# Patient Record
Sex: FEMALE | Race: WHITE | NOT HISPANIC OR LATINO | URBAN - METROPOLITAN AREA
[De-identification: names, ages, dates, MRNs, and addresses within clinical notes are randomized per-mention and may not be internally consistent; named-entity substitution may affect disease eponyms.]

---

## 2024-08-03 ENCOUNTER — INPATIENT (INPATIENT)
Facility: HOSPITAL | Age: 79
LOS: 2 days | Discharge: HOME | DRG: 641 | End: 2024-08-06
Attending: FAMILY MEDICINE | Admitting: FAMILY MEDICINE
Payer: MEDICARE

## 2024-08-03 VITALS
TEMPERATURE: 98 F | WEIGHT: 163.58 LBS | OXYGEN SATURATION: 95 % | DIASTOLIC BLOOD PRESSURE: 77 MMHG | SYSTOLIC BLOOD PRESSURE: 174 MMHG | HEART RATE: 85 BPM | RESPIRATION RATE: 18 BRPM

## 2024-08-03 DIAGNOSIS — R11.0 NAUSEA: ICD-10-CM

## 2024-08-03 DIAGNOSIS — E78.00 PURE HYPERCHOLESTEROLEMIA, UNSPECIFIED: ICD-10-CM

## 2024-08-03 DIAGNOSIS — E11.65 TYPE 2 DIABETES MELLITUS WITH HYPERGLYCEMIA: ICD-10-CM

## 2024-08-03 DIAGNOSIS — E86.0 DEHYDRATION: ICD-10-CM

## 2024-08-03 DIAGNOSIS — K80.20 CALCULUS OF GALLBLADDER WITHOUT CHOLECYSTITIS WITHOUT OBSTRUCTION: ICD-10-CM

## 2024-08-03 DIAGNOSIS — N32.9 BLADDER DISORDER, UNSPECIFIED: ICD-10-CM

## 2024-08-03 DIAGNOSIS — I10 ESSENTIAL (PRIMARY) HYPERTENSION: ICD-10-CM

## 2024-08-03 LAB
A1C WITH ESTIMATED AVERAGE GLUCOSE RESULT: 7.9 % — HIGH (ref 4–5.6)
ANION GAP SERPL CALC-SCNC: 6 MMOL/L — SIGNIFICANT CHANGE UP (ref 5–17)
BUN SERPL-MCNC: 9 MG/DL — SIGNIFICANT CHANGE UP (ref 7–23)
CALCIUM SERPL-MCNC: 8.3 MG/DL — LOW (ref 8.5–10.1)
CHLORIDE SERPL-SCNC: 111 MMOL/L — HIGH (ref 96–108)
CO2 SERPL-SCNC: 25 MMOL/L — SIGNIFICANT CHANGE UP (ref 22–31)
CREAT SERPL-MCNC: 1.2 MG/DL — SIGNIFICANT CHANGE UP (ref 0.5–1.3)
EGFR: 46 ML/MIN/1.73M2 — LOW
ESTIMATED AVERAGE GLUCOSE: 180 MG/DL — HIGH (ref 68–114)
GLUCOSE BLDC GLUCOMTR-MCNC: 147 MG/DL — HIGH (ref 70–99)
GLUCOSE BLDC GLUCOMTR-MCNC: 215 MG/DL — HIGH (ref 70–99)
GLUCOSE BLDC GLUCOMTR-MCNC: 254 MG/DL — HIGH (ref 70–99)
GLUCOSE BLDC GLUCOMTR-MCNC: 95 MG/DL — SIGNIFICANT CHANGE UP (ref 70–99)
GLUCOSE SERPL-MCNC: 260 MG/DL — HIGH (ref 70–99)
POTASSIUM SERPL-MCNC: 3.4 MMOL/L — LOW (ref 3.5–5.3)
POTASSIUM SERPL-SCNC: 3.4 MMOL/L — LOW (ref 3.5–5.3)
SODIUM SERPL-SCNC: 142 MMOL/L — SIGNIFICANT CHANGE UP (ref 135–145)

## 2024-08-03 PROCEDURE — 76705 ECHO EXAM OF ABDOMEN: CPT | Mod: 26

## 2024-08-03 RX ORDER — INSULIN LISPRO 100/ML
VIAL (ML) SUBCUTANEOUS AT BEDTIME
Refills: 0 | Status: DISCONTINUED | OUTPATIENT
Start: 2024-08-03 | End: 2024-08-06

## 2024-08-03 RX ORDER — DEXTROSE 4 G
25 TABLET,CHEWABLE ORAL ONCE
Refills: 0 | Status: DISCONTINUED | OUTPATIENT
Start: 2024-08-03 | End: 2024-08-06

## 2024-08-03 RX ORDER — MELATONIN 3 MG
3 TABLET ORAL AT BEDTIME
Refills: 0 | Status: DISCONTINUED | OUTPATIENT
Start: 2024-08-03 | End: 2024-08-03

## 2024-08-03 RX ORDER — SODIUM CHLORIDE AND POTASSIUM CHLORIDE 150; 450 MG/100ML; MG/100ML
1000 INJECTION, SOLUTION INTRAVENOUS
Refills: 0 | Status: DISCONTINUED | OUTPATIENT
Start: 2024-08-03 | End: 2024-08-03

## 2024-08-03 RX ORDER — DEXTROSE MONOHYDRATE, SODIUM CHLORIDE, SODIUM LACTATE, CALCIUM CHLORIDE, MAGNESIUM CHLORIDE 1.5; 538; 448; 18.4; 5.08 G/100ML; MG/100ML; MG/100ML; MG/100ML; MG/100ML
1000 SOLUTION INTRAPERITONEAL
Refills: 0 | Status: DISCONTINUED | OUTPATIENT
Start: 2024-08-03 | End: 2024-08-06

## 2024-08-03 RX ORDER — INSULIN LISPRO 100/ML
VIAL (ML) SUBCUTANEOUS EVERY 6 HOURS
Refills: 0 | Status: DISCONTINUED | OUTPATIENT
Start: 2024-08-03 | End: 2024-08-03

## 2024-08-03 RX ORDER — LIDOCAINE 5% 5 G/100G
5 CREAM TOPICAL DAILY
Refills: 0 | Status: DISCONTINUED | OUTPATIENT
Start: 2024-08-03 | End: 2024-08-06

## 2024-08-03 RX ORDER — INSULIN LISPRO 100/ML
VIAL (ML) SUBCUTANEOUS
Refills: 0 | Status: DISCONTINUED | OUTPATIENT
Start: 2024-08-03 | End: 2024-08-06

## 2024-08-03 RX ORDER — INSULIN LISPRO 100/ML
VIAL (ML) SUBCUTANEOUS
Refills: 0 | Status: DISCONTINUED | OUTPATIENT
Start: 2024-08-03 | End: 2024-08-03

## 2024-08-03 RX ORDER — INSULIN LISPRO 100/ML
VIAL (ML) SUBCUTANEOUS AT BEDTIME
Refills: 0 | Status: DISCONTINUED | OUTPATIENT
Start: 2024-08-03 | End: 2024-08-03

## 2024-08-03 RX ORDER — GUAIFEN/P-PROPANOLAMIN/CODEINE
5 EXPECTORANT ORAL AT BEDTIME
Refills: 0 | Status: DISCONTINUED | OUTPATIENT
Start: 2024-08-03 | End: 2024-08-06

## 2024-08-03 RX ORDER — DEXTROSE 4 G
12.5 TABLET,CHEWABLE ORAL ONCE
Refills: 0 | Status: DISCONTINUED | OUTPATIENT
Start: 2024-08-03 | End: 2024-08-06

## 2024-08-03 RX ORDER — MAGNESIUM, ALUMINUM HYDROXIDE 200-225/5
30 SUSPENSION, ORAL (FINAL DOSE FORM) ORAL EVERY 4 HOURS
Refills: 0 | Status: DISCONTINUED | OUTPATIENT
Start: 2024-08-03 | End: 2024-08-06

## 2024-08-03 RX ORDER — GLIMEPIRIDE 4 MG/1
1 TABLET ORAL
Refills: 0 | DISCHARGE

## 2024-08-03 RX ORDER — GLUCAGON INJECTION, SOLUTION 0.5 MG/.1ML
1 INJECTION, SOLUTION SUBCUTANEOUS ONCE
Refills: 0 | Status: DISCONTINUED | OUTPATIENT
Start: 2024-08-03 | End: 2024-08-06

## 2024-08-03 RX ORDER — METOCLOPRAMIDE HCL 5 MG/ML
5 VIAL (ML) INJECTION EVERY 8 HOURS
Refills: 0 | Status: DISCONTINUED | OUTPATIENT
Start: 2024-08-03 | End: 2024-08-03

## 2024-08-03 RX ORDER — ACETAMINOPHEN 500 MG
650 TABLET ORAL EVERY 6 HOURS
Refills: 0 | Status: DISCONTINUED | OUTPATIENT
Start: 2024-08-03 | End: 2024-08-06

## 2024-08-03 RX ORDER — SODIUM CHLORIDE AND POTASSIUM CHLORIDE 150; 450 MG/100ML; MG/100ML
1000 INJECTION, SOLUTION INTRAVENOUS
Refills: 0 | Status: DISCONTINUED | OUTPATIENT
Start: 2024-08-03 | End: 2024-08-06

## 2024-08-03 RX ORDER — HEPARIN SODIUM 1000 [USP'U]/ML
5000 INJECTION, SOLUTION INTRAVENOUS; SUBCUTANEOUS EVERY 12 HOURS
Refills: 0 | Status: DISCONTINUED | OUTPATIENT
Start: 2024-08-03 | End: 2024-08-05

## 2024-08-03 RX ORDER — ONDANSETRON HCL/PF 4 MG/2 ML
4 VIAL (ML) INJECTION EVERY 8 HOURS
Refills: 0 | Status: DISCONTINUED | OUTPATIENT
Start: 2024-08-03 | End: 2024-08-06

## 2024-08-03 RX ORDER — LOSARTAN POTASSIUM 50 MG/1
100 TABLET, FILM COATED ORAL DAILY
Refills: 0 | Status: DISCONTINUED | OUTPATIENT
Start: 2024-08-03 | End: 2024-08-05

## 2024-08-03 RX ORDER — AMLODIPINE BESYLATE 2.5 MG/1
2.5 TABLET ORAL EVERY 24 HOURS
Refills: 0 | Status: DISCONTINUED | OUTPATIENT
Start: 2024-08-03 | End: 2024-08-05

## 2024-08-03 RX ORDER — MAGNESIUM OXIDE 253 MG/1
400 TABLET ORAL DAILY
Refills: 0 | Status: DISCONTINUED | OUTPATIENT
Start: 2024-08-03 | End: 2024-08-06

## 2024-08-03 RX ORDER — MECLIZINE 12.5 MG/1
12.5 TABLET ORAL THREE TIMES A DAY
Refills: 0 | Status: DISCONTINUED | OUTPATIENT
Start: 2024-08-03 | End: 2024-08-06

## 2024-08-03 RX ORDER — DEXTROSE 4 G
15 TABLET,CHEWABLE ORAL ONCE
Refills: 0 | Status: DISCONTINUED | OUTPATIENT
Start: 2024-08-03 | End: 2024-08-06

## 2024-08-03 RX ADMIN — MAGNESIUM OXIDE 400 MILLIGRAM(S): 253 TABLET ORAL at 11:24

## 2024-08-03 RX ADMIN — SODIUM CHLORIDE AND POTASSIUM CHLORIDE 75 MILLILITER(S): 150; 450 INJECTION, SOLUTION INTRAVENOUS at 22:13

## 2024-08-03 RX ADMIN — LOSARTAN POTASSIUM 100 MILLIGRAM(S): 50 TABLET, FILM COATED ORAL at 06:31

## 2024-08-03 RX ADMIN — Medication 40 MILLIGRAM(S): at 21:37

## 2024-08-03 RX ADMIN — Medication 4: at 17:38

## 2024-08-03 RX ADMIN — Medication 650 MILLIGRAM(S): at 21:00

## 2024-08-03 RX ADMIN — HEPARIN SODIUM 5000 UNIT(S): 1000 INJECTION, SOLUTION INTRAVENOUS; SUBCUTANEOUS at 17:37

## 2024-08-03 RX ADMIN — Medication 650 MILLIGRAM(S): at 20:01

## 2024-08-03 RX ADMIN — Medication 5 MILLIGRAM(S): at 22:51

## 2024-08-03 RX ADMIN — SODIUM CHLORIDE AND POTASSIUM CHLORIDE 75 MILLILITER(S): 150; 450 INJECTION, SOLUTION INTRAVENOUS at 07:38

## 2024-08-03 RX ADMIN — AMLODIPINE BESYLATE 2.5 MILLIGRAM(S): 2.5 TABLET ORAL at 11:24

## 2024-08-03 NOTE — H&P ADULT - MUSCULOSKELETAL
ROM intact/no joint swelling/no joint erythema/no joint warmth/decreased strength/abnormal gait details…

## 2024-08-03 NOTE — H&P ADULT - HISTORY OF PRESENT ILLNESS
ADÁN MERRILLBISHOPSILVIOMARCELASOTO is a  77 YO Female with PMH of HTN, DM who presents with some lightheadedness, nausea and concern for dehydration. patient states that for the past couple days, she has been having the symptoms, lightheaded like she might pass out, some nausea, decreased appetite and overall fatigue. of note, patient has been dealing with tooth infection and root canal, has been on abx for past month, finished last course a few days ago.   ADÁN MERRILLBISHOPSILVIOMARCELASOTO is a  79 YO Female with PMH of HTN, DM who presents with lightheadedness, nausea and concern for dehydration. Patient states that for the past couple days, she has been having the symptoms, lightheaded like she might pass out, some nausea, decreased appetite and fatigue. Patient has been dealing with tooth infection and root canal, has been on abiotic for past month, finished last course a few days ago.

## 2024-08-03 NOTE — H&P ADULT - NEUROLOGICAL
details… cranial nerves II-XII intact/sensation intact/deep reflexes intact/cranial nerves intact/no spontaneous movement/superficial reflexes intact/upper extremity  reflex/lower extremity reflex

## 2024-08-03 NOTE — CHART NOTE - NSCHARTNOTEFT_GEN_A_CORE
RN called, patient was transferred from , had orders that are on hold. RN requesting to release orders that have "hold session".  - Will reactivate orders  - Primary team to follow in daytime shift  - RN to call if any changes RN called, patient was transferred from , had orders that are on hold. RN requesting to release orders that have "hold session".  - Will reactivate orders  - Primary team to follow in daytime shift  - RN to call if any changes    _____________________________________________________________________  ADDENDUM  RN called as ultrasound requesting patient be NPO for US abdomen.   - No documents listed, chart may still be in process of merging with  chart  - NPO except medications  - RN to call if any changes RN called, patient was transferred from , had orders that are on hold. RN requesting to release orders that have "hold session".  - Will reactivate orders  - Primary team to follow in daytime shift  - RN to call if any changes    _____________________________________________________________________  ADDENDUM  RN called as ultrasound requesting patient be NPO for US abdomen.     On chart review, ED SY:  78F with PMH of HTN, DM who presents with some lightheadedness, nausea and concern for dehydration. patient states that for the past couple days, she has been having the symptoms, lightheaded like she might pass out, some nausea, decreased appetite and overall fatigue. of note, patient has been dealing with tooth infection and root canal, has been on abx for past month, finished last course a few days ago.    - No documents listed, [facility] chart may still be in process of merging with SY chart  - No H&P listed. Primary team to open and complete  - NPO except medications  - RN to call if any changes RN called, patient was transferred from , had orders that are on hold. RN requesting to release orders that have "hold session".  - Will reactivate orders  - Primary team to follow in daytime shift  - RN to call if any changes    _____________________________________________________________________  ADDENDUM  RN called as ultrasound requesting patient be NPO for US abdomen.     On chart review, ED SY:  78F with PMH of HTN, DM who presents with some lightheadedness, nausea and concern for dehydration. patient states that for the past couple days, she has been having the symptoms, lightheaded like she might pass out, some nausea, decreased appetite and overall fatigue. of note, patient has been dealing with tooth infection and root canal, has been on abx for past month, finished last course a few days ago.    - No documents listed, [facility] chart may still be in process of merging with SY chart  - No H&P listed. Primary team to open and complete  - NPO except medications  - RN to call if any changes    _____________________________________________________________________  ADDENDUM  Pharmacy called as Primary Dr. Mercado ordered both melatonin and ambien PRN. On chart review, patient takes ambien at home. Pharmacy also requesting to discontinue reglan for breakthrough nausea as patient had PRN zofran ordered and x1 doses of reglan would be preferred instead of PRN  - Will discontinue PRN melatonin and reglan  - Primary team to follow in daytime shift, to reassess need for reglan

## 2024-08-03 NOTE — CONSULT NOTE ADULT - ASSESSMENT
Pancreatic head calcification versus choledocholithiasis  Nausea    zofran PRN  diet as tolerated  monitor for abd. pain  will need MRCP  d/w pt and family  will follow

## 2024-08-03 NOTE — PHYSICAL THERAPY INITIAL EVALUATION ADULT - PERTINENT HX OF CURRENT PROBLEM, REHAB EVAL
Per H&P: " is a  79 YO Female with PMH of HTN, DM who presents with some lightheadedness, nausea and concern for dehydration. patient states that for the past couple days, she has been having the symptoms, lightheaded like she might pass out, some nausea, decreased appetite and overall fatigue. of note, patient has been dealing with tooth infection and root canal, has been on abx for past month, finished last course a few days ago."

## 2024-08-03 NOTE — PHYSICAL THERAPY INITIAL EVALUATION ADULT - MD/RN NOTIFIED
PAST MEDICAL HISTORY:  DM (diabetes mellitus)     ESRD on dialysis     H/O:      H/O: hysterectomy     HLD (hyperlipidemia)     HTN (hypertension)     S/P CABG x 3     
yes

## 2024-08-03 NOTE — CONSULT NOTE ADULT - SUBJECTIVE AND OBJECTIVE BOX
[facility]    Alvin Sharad Villa NP    [address] [place] [address] [phone]      Chief Complaint:  Patient is a 78y old  Female who presents with a chief complaint of nausea, dehydration lightheadedness     HPI:HPI:  DAÁN GUSMAN is a  79 YO Female with PMH of HTN, DM who presents with some lightheadedness, nausea and concern for dehydration. patient states that for the past couple days, she has been having the symptoms, lightheaded like she might pass out, some nausea, decreased appetite and overall fatigue. of note, patient has been dealing with tooth infection and root canal, has been on abx for past month, finished last course a few days ago.   (03 Aug 2024 05:43)  GI consulted for CT findings of  Pancreatic head calcification versus choledocholithiasis. pt seen and examined daughter at bedside. Pt denies having abdominal pain, continues to have intermittent nausea no vomiting. No prior hx of gi problems was following up with Dr. Alvarez had EGD/Colonoscopy many years ago. No history of constipation.     Allergies:  iodine (Unknown)      Medications:  acetaminophen     Tablet .. 650 milliGRAM(s) Oral every 6 hours PRN  aluminum hydroxide/magnesium hydroxide/simethicone Suspension 30 milliLiter(s) Oral every 4 hours PRN  amLODIPine   Tablet 2.5 milliGRAM(s) Oral every 24 hours  dextrose 5%. 1000 milliLiter(s) IV Continuous <Continuous>  dextrose 5%. 1000 milliLiter(s) IV Continuous <Continuous>  dextrose 50% Injectable 25 Gram(s) IV Push once  dextrose 50% Injectable 25 Gram(s) IV Push once  dextrose 50% Injectable 12.5 Gram(s) IV Push once  dextrose Oral Gel 15 Gram(s) Oral once  glucagon  Injectable 1 milliGRAM(s) IntraMuscular once  heparin   Injectable 5000 Unit(s) SubCutaneous every 12 hours  insulin lispro (ADMELOG) corrective regimen sliding scale   SubCutaneous three times a day before meals  insulin lispro (ADMELOG) corrective regimen sliding scale   SubCutaneous at bedtime  lidocaine 2% Viscous 5 milliLiter(s) Swish and Spit daily PRN  losartan 100 milliGRAM(s) Oral daily  magnesium oxide 400 milliGRAM(s) Oral daily  meclizine 12.5 milliGRAM(s) Oral three times a day PRN  ondansetron Injectable 4 milliGRAM(s) IV Push every 8 hours PRN  simvastatin 40 milliGRAM(s) Oral at bedtime  sodium chloride 0.45% with potassium chloride 20 mEq/L 1000 milliLiter(s) IV Continuous <Continuous>  zolpidem 5 milliGRAM(s) Oral at bedtime PRN  zolpidem 5 milliGRAM(s) Oral at bedtime PRN      PMHX/PSHX:      Family history:      Social History:     ROS:     General:  No wt loss, fevers, chills, night sweats, fatigue,   Eyes:  Good vision, no reported pain  ENT:  No sore throat, pain, runny nose, dysphagia  CV:  No pain, palpitations, hypo/hypertension  Resp:  No dyspnea, cough, tachypnea, wheezing  GI:  No pain, +nausea, No vomiting, No diarrhea, No constipation, No weight loss, No fever, No pruritis, No rectal bleeding, No tarry stools, No dysphagia,  :  No pain, bleeding, incontinence, nocturia  Muscle:  No pain, weakness  Neuro:  No weakness, tingling, memory problems  Psych:  No fatigue, insomnia, mood problems, depression  Endocrine:  No polyuria, polydipsia, cold/heat intolerance  Heme:  No petechiae, ecchymosis, easy bruisability  Skin:  No rash, tattoos, scars, edema      PHYSICAL EXAM:   Vital Signs Last 24 Hrs  T(C): 36.5 (03 Aug 2024 11:15), Max: 36.8 (02 Aug 2024 15:26)  T(F): 97.7 (03 Aug 2024 11:15), Max: 98.2 (02 Aug 2024 15:26)  HR: 72 (03 Aug 2024 11:15) (67 - 85)  BP: 149/68 (03 Aug 2024 11:15) (139/65 - 174/77)  BP(mean): --  RR: 18 (03 Aug 2024 11:15) (15 - 18)  SpO2: 98% (03 Aug 2024 11:15) (95% - 98%)    Parameters below as of 03 Aug 2024 11:15  Patient On (Oxygen Delivery Method): room air      Daily     Daily Weight in k.2 (03 Aug 2024 05:29)    GENERAL:  Appears stated age, well-groomed, well-nourished, no distress  HEENT:  NC/AT,  conjunctivae clear and pink, no thyromegaly, nodules, adenopathy, no JVD, sclera -anicteric  CHEST:  Full & symmetric excursion, no increased effort, breath sounds clear  HEART:  Regular rhythm, S1, S2, no murmur/rub/S3/S4, no abdominal bruit, no edema  ABDOMEN:  Soft, non-tender, non-distended, normoactive bowel sounds,  no masses ,no hepato-splenomegaly, no signs of chronic liver disease  EXTEREMITIES:  no cyanosis,clubbing or edema  SKIN:  No rash/erythema/ecchymoses/petechiae/wounds/abscess/warm/dry  NEURO:  Alert, oriented, no asterixis, no tremor, no encephalopathy    LABS:                        11.9   5.26  )-----------( 230      ( 02 Aug 2024 16:09 )             35.8     08-02    137  |  102  |  12  ----------------------------<  250<H>  3.3<L>   |  24  |  1.30    Ca    9.0      02 Aug 2024 16:09  Mg     1.5     08-    TPro  6.8  /  Alb  3.5  /  TBili  0.4  /  DBili  x   /  AST  32  /  ALT  43  /  AlkPhos  57  08-02    LIVER FUNCTIONS - ( 02 Aug 2024 16:09 )  Alb: 3.5 g/dL / Pro: 6.8 g/dL / ALK PHOS: 57 U/L / ALT: 43 U/L / AST: 32 U/L / GGT: x             Urinalysis Basic - ( 02 Aug 2024 17:22 )    Color: Yellow / Appearance: Clear / S.003 / pH: x  Gluc: x / Ketone: Negative mg/dL  / Bili: Negative / Urobili: 0.2 mg/dL   Blood: x / Protein: Negative mg/dL / Nitrite: Negative   Leuk Esterase: Moderate / RBC: 0 /HPF / WBC 6 /HPF   Sq Epi: x / Non Sq Epi: x / Bacteria: Occasional /HPF          Imaging:      < from: CT Abdomen and Pelvis No Cont (24 @ 19:53) >    ACC: [id number] EXAM:  CT ABDOMEN AND PELVIS   ORDERED BY:  JALEESA GONZALEZ     PROCEDURE DATE:  2024          INTERPRETATION:  CLINICAL INFORMATION: Persistent nausea.    COMPARISON: None.    CONTRAST/COMPLICATIONS:  IV Contrast: NONE  Oral Contrast:NONE  Complications: None reported at time of study completion    PROCEDURE:  CT of the Abdomen and Pelvis was performed.  Sagittal and coronal reformats were performed.    FINDINGS:  LOWER CHEST: Large hiatal hernia. Coronary artery calcifications.    LIVER: 1.8 cm cyst hepatic segment 7.  BILE DUCTS: Normal caliber.  GALLBLADDER: Within normal limits.  SPLEEN: Within normal limits.  PANCREAS: Partial fatty replacement. 3 mm calcification in the head of   the pancreas or within the distal CBD.  ADRENALS: Within normal limits.  KIDNEYS/URETERS: Bilateral renal cortical thinning. Left lower pole   parapelvic renal cysts.    BLADDER: 1.8 cm polypoid soft tissue polypoid mass in the right trigone   consistent with urothelial neoplasm.  REPRODUCTIVE ORGANS: Hysterectomy.    BOWEL: No bowel obstruction. Appendix is normal. Colonic diverticulosis.   Large hiatal hernia.  PERITONEUM/RETROPERITONEUM: Within normal limits.  VESSELS: Severe atherosclerotic calcifications.  LYMPH NODES: No lymphadenopathy.  ABDOMINAL WALL: Small fat-containing umbilical hernia.  BONES: Degenerative changes. Osteopenia.    IMPRESSION:  Large hiatal hernia.    1.8 cm polypoid soft tissue polypoid mass in the right trigone consistent   with urothelial urinary bladder neoplasm. Recommend urology consultation.    Pancreatic head calcification versus choledocholithiasis. Recommend MRCP.    Additional findings noted above.    --- End of Report ---            JT GREWAL MD; Attending Radiologist  This document has been electronically signed. Aug  2 2024  8:27PM    < end of copied text >          
  Patient is a 78y old  Female who presents with a chief complaint of     Reason For Consult: dm2 uncontrolled    HPI:  ADÁN GUSMAN is a  79 YO Female with PMH of HTN, DM who presents with some lightheadedness, nausea and concern for dehydration. patient states that for the past couple days, she has been having the symptoms, lightheaded like she might pass out, some nausea, decreased appetite and overall fatigue. of note, patient has been dealing with tooth infection and root canal, has been on abx for past month, finished last course a few days ago.   (03 Aug 2024 05:43)      PAST MEDICAL & SURGICAL HISTORY:      FAMILY HISTORY:        Social History:    MEDICATIONS  (STANDING):  amLODIPine   Tablet 2.5 milliGRAM(s) Oral every 24 hours  dextrose 5%. 1000 milliLiter(s) (50 mL/Hr) IV Continuous <Continuous>  dextrose 5%. 1000 milliLiter(s) (100 mL/Hr) IV Continuous <Continuous>  dextrose 50% Injectable 25 Gram(s) IV Push once  dextrose 50% Injectable 12.5 Gram(s) IV Push once  dextrose 50% Injectable 25 Gram(s) IV Push once  dextrose Oral Gel 15 Gram(s) Oral once  glucagon  Injectable 1 milliGRAM(s) IntraMuscular once  heparin   Injectable 5000 Unit(s) SubCutaneous every 12 hours  insulin lispro (ADMELOG) corrective regimen sliding scale   SubCutaneous every 6 hours  losartan 100 milliGRAM(s) Oral daily  magnesium oxide 400 milliGRAM(s) Oral daily  simvastatin 40 milliGRAM(s) Oral at bedtime  sodium chloride 0.9% with potassium chloride 20 mEq/L 1000 milliLiter(s) (50 mL/Hr) IV Continuous <Continuous>    MEDICATIONS  (PRN):  acetaminophen     Tablet .. 650 milliGRAM(s) Oral every 6 hours PRN Temp greater or equal to 38C (100.4F), Mild Pain (1 - 3)  aluminum hydroxide/magnesium hydroxide/simethicone Suspension 30 milliLiter(s) Oral every 4 hours PRN Dyspepsia  meclizine 12.5 milliGRAM(s) Oral three times a day PRN Dizziness  ondansetron Injectable 4 milliGRAM(s) IV Push every 8 hours PRN Nausea and/or Vomiting  zolpidem 5 milliGRAM(s) Oral at bedtime PRN Insomnia  zolpidem 5 milliGRAM(s) Oral at bedtime PRN Insomnia        T(C): 36.5 (08-03-24 @ 11:15), Max: 36.8 (08-02-24 @ 15:26)  HR: 72 (08-03-24 @ 11:15) (67 - 85)  BP: 149/68 (08-03-24 @ 11:15) (139/65 - 174/77)  RR: 18 (08-03-24 @ 11:15) (15 - 18)  SpO2: 98% (08-03-24 @ 11:15) (95% - 98%)  Wt(kg): --    PHYSICAL EXAM:  CHEST/LUNG: Clear to percussion bilaterally; No rales, rhonchi, wheezing, or rubs  HEART: Regular rate and rhythm; No murmurs, rubs, or gallops  ABDOMEN: Soft, Nontender, Nondistended; Bowel sounds present  EXTREMITIES:  2+ Peripheral Pulses, No clubbing, cyanosis, or edema  SKIN: No rashes or lesions    CAPILLARY BLOOD GLUCOSE      POCT Blood Glucose.: 254 mg/dL (03 Aug 2024 11:48)  POCT Blood Glucose.: 147 mg/dL (03 Aug 2024 05:22)  POCT Blood Glucose.: 146 mg/dL (02 Aug 2024 20:48)  POCT Blood Glucose.: 268 mg/dL (02 Aug 2024 15:39)                            11.9   5.26  )-----------( 230      ( 02 Aug 2024 16:09 )             35.8       CMP:  08-02 @ 16:09  SGPT 43  Albumin 3.5   Alk Phos 57   Anion Gap 11   SGOT 32   Total Bili 0.4   BUN 12   Calcium Total 9.0   CO2 24   Chloride 102   Creatinine 1.30   eGFR if AA --   eGFR if non AA --   Glucose 250   Potassium 3.3   Protein 6.8   Sodium 137      Thyroid Function Tests:      Diabetes Tests:       Radiology:

## 2024-08-03 NOTE — H&P ADULT - NSHPLABSRESULTS_GEN_ALL_CORE
11.9   5.26  )-----------( 230      ( 02 Aug 2024 16:09 )             35.8     02 Aug 2024 16:09    137    |  102    |  12     ----------------------------<  250    3.3     |  24     |  1.30     Ca    9.0        02 Aug 2024 16:09  Mg     1.5       02 Aug 2024 16:09    TPro  6.8    /  Alb  3.5    /  TBili  0.4    /  DBili  x      /  AST  32     /  ALT  43     /  AlkPhos  57     02 Aug 2024 16:09    LIVER FUNCTIONS - ( 02 Aug 2024 16:09 )  Alb: 3.5 g/dL / Pro: 6.8 g/dL / ALK PHOS: 57 U/L / ALT: 43 U/L / AST: 32 U/L / GGT: x           CAPILLARY BLOOD GLUCOSE    POCT Blood Glucose.: 147 mg/dL (03 Aug 2024 05:22)  POCT Blood Glucose.: 146 mg/dL (02 Aug 2024 20:48)  POCT Blood Glucose.: 268 mg/dL (02 Aug 2024 15:39)    Urinalysis Basic - ( 02 Aug 2024 17:22 )    Color: Yellow / Appearance: Clear / S.003 / pH: x  Gluc: x / Ketone: Negative mg/dL  / Bili: Negative / Urobili: 0.2 mg/dL   Blood: x / Protein: Negative mg/dL / Nitrite: Negative   Leuk Esterase: Moderate / RBC: 0 /HPF / WBC 6 /HPF   Sq Epi: x / Non Sq Epi: x / Bacteria: Occasional /HPF    < from: CT Head No Cont (24 @ 19:53) >    IMPRESSION:  No acute intracranial hemorrhage, mass effect, or midline shift.    < end of copied text >    < from: CT Abdomen and Pelvis No Cont (24 @ 19:53) >    IMPRESSION:  Large hiatal hernia.    1.8 cm polypoid soft tissue polypoid mass in the right trigone consistent   with urothelial urinary bladder neoplasm. Recommend urology consultation.    Pancreatic head calcification versus choledocholithiasis. Recommend MRCP.    Additional findings noted above.    < end of copied text >

## 2024-08-03 NOTE — H&P ADULT - RESPIRATORY
normal/clear to auscultation bilaterally/no wheezes/no rales/no rhonchi/intercostal retractions/diminished breath sounds, L

## 2024-08-03 NOTE — PHYSICAL THERAPY INITIAL EVALUATION ADULT - PATIENT PROFILE REVIEW, REHAB EVAL
PT orders received: ambulate as tolerated. Consult with RN Hannah, pt may participate in PT evaluation./yes

## 2024-08-03 NOTE — PATIENT PROFILE ADULT - INTERNATIONAL TRAVEL
History and Physical      Patient Name: Capo Flores   Patient ID: [id number]   Sex: Male   YOB: 2021        Visit Date: 2020    Provider: Ryley Cruz MD   Location: [facility]   Location Address: [address] [place] [address], [address]  [place], [place]  [id number]   Location Phone: [phone]          Chief Complaint  ·  hospital follow-up      History Of Present Illness  The patient is a 4 day old /White male who presents for hospital follow up after  discharge. The child is accompanied by his mother.   Parent Concerns  Mom has no concerns   Maternal Information  The pregnancy was complicated by. late PN   Maternal labs include:   HIV Negative   Hepatitis B Negative   Blood Type/Rh A negative   VDRL Negative   Maternal GBS unknown   Delivery  The child was born via normal spontaneous vaginal delivery at 35.5 weeks EGA. The patient's  course was complicated by tachypnea,nasal flaring,retractions at 10 minutes of life.   The birth weight was 5 pounds, 8 ounces and the hospital discharge weight was 5 pounds, 8 ounces.   ______________________________________________________________________________________  Sleep  The baby is sleeping continuously for up to 3 hours at a time.   Nutrition  The patient is being breast-fed and bottle-fed. He feeds at the breast for 10-15 minutes every 2-3 hours The child is taking in approximately 2 ounces of Neosure and per 24 hours. Source of water is city water.   Elimination  The infant is having 7 wet diapers per day 4 stools per day.   [place] Screening  The infant did pass his hearing screen.   He did pass CHD screeen in nursery.   His  screen is pending.   Growth Chart Reviewed. (F3)   Immunizations (ALT-V): Hepatitis B- up to date.             Allergy List    Allergies Reconciled  Review of Systems  · Constitutional  o Denies  o : fever, fatigue, fussiness, agitation, weight  "changes  · Eyes  o Denies  o : redness, discharge  · HENT  o Denies  o : rhinorrhea, congestion, ear drainage, pulling at ears, mouth sores  · Cardiovascular  o Denies  o : cyanosis, difficulty with feeds  · Respiratory  o Denies  o : cough, wheezing, retractions, increased work of breathing  · Gastrointestinal  o Denies  o : vomiting, diarrhea, constipation, decreased PO intake  · Genitourinary  o Denies  o : hematuria, decreased urine output, discharge  · Integument  o Denies  o : rash, bruising, lesions  · Neurologic  o Denies  o : altered mental status, seizure activity, syncope  · Musculoskeletal  o Denies  o : limp, weakness  · Allergic-Immunologic  o Denies  o : frequent illnesses, allergies      Vitals  Date Time BP Position Site L\R Cuff Size HR RR TEMP (F) WT  HT  BMI kg/m2 BSA m2 O2 Sat HC       2020 10:09 AM         5lbs 8.18oz 1'  7\" 10.73 0.18     2020 10:09 AM         5lbs 8.18oz        2020 10:20 AM      190 - R 16 98.2 5lbs 3.5oz 1'  6.7\" 10.49 0.18 99 % 12.8\"         Physical Examination  · Constitutional  o Tone/Appearance  o : vigorous, good cry, good tone, normal color, no acute distress  · Head and Face  o Head/Neck  o : normocephalic, AF and PF soft., open and flat, sutures well approximated, neck supple, no masses appreciated  · Eyes  o Eyes  o : opens eyes, +RR bilaterally, No discharge  · Ears, Nose, Mouth and Throat  o ENT  o : ears normally positioned and well-formed without pits or tags., nares patent, hard and soft palate intact  · Respiratory  o Inspection of Chest  o :   § Thorax  § : clavicles stable with no crepitus  o Lungs  o : normal chest rise, CTAB  · Cardiovascular  o Heart  o : normal pericordial impulse, RRR, Normal S1 and S2, no murmurs, brachial pulses 2+ and equal bilaterally  o Femoral pulses  o : 2+ and equal bilaterally, no brachiofemoral delay  · Gastrointestinal  o Abdomen  o : soft, non-tender, non-distended, +BS, no hepatosplenomegaly, no masses " palpated  o Umbilical  o : non-erythematous, cord is clean, dry, and intact  · Genitourinary  o Genitals  o :   § Genitals  § : normal male, testes decended , palpable bilaterally  § Anus  § : patent  · Musculoskeletal  o Trunk/Spine  o : no scoliosis or deformities noted, no sacral pits or sd  o Extremeties/Joint  o : Elias negative, Ortelolani negative, no hip clicks or clunks  · Skin and Subcutaneous Tissue  o Skin  o : pink, no jaundice  · Neurologic  o Neurologic/Reflexes  o : actively moves all extremeties, positive suck, rooting, Tiarra, nelson, plantar grasp, [place]          Results  · In-Office Procedures  o Lab procedure  § IOP - BiliChek (70743)   § Bilichek: 9.4 mg/dL  § Internal Control Verified?: Yes       Assessment  · Health supervision for  under 8 days old     V20.31/Z00.110    Problems Reconciled  Plan  · Medications  o Medications have been Reconciled  o Transition of Care or Provider Policy  · Instructions  o Instructed to follow up in 2 weeks.  o Discussed  skin care with caregiver.  o Discussed umbilical cord care with care provider.  o Encouraged to continue breastfeeding, feed infant every 2-3 hours during the day and every 3-4 hours at night.  o Safety and anticipatory guidance discussed and handout given which includes the information below:  o Make sure your baby is put to sleep on his/her back without heavy blankets, stuffed animals, or pillows until he/she can roll over on his/her own.  o Your baby should have at least 6-8 wet diapers each day. Please call our office if your baby has significantly less than that.  o Make sure your babby uses a rear-facing car seat in the back seat of your car until your baby is over 2 years of age.  o At this age, it is recommended that temperatures be taken rectally. Do not add or subtract a degree. A fever is considered anything greater than 100.4 degrees. If your baby is less than 30 days old, please call our office as soon as  possible if your baby has a fever greater than 100.4 rectally. You may give one dose ofTylenol prior to calling.   o Please keep important phone numbers close by: poison control [phone], our office [phone] , etc.   o If you have any concerns, questions, or problems, please call our office at [phone].   o Electronically Identified Patient Education Materials Provided Electronically  · Disposition  o f/u 2 weeks            Electronically Signed by: Ryley Cruz MD -Author on August 25, 2020 10:46:50 AM   No

## 2024-08-03 NOTE — PHYSICAL THERAPY INITIAL EVALUATION ADULT - ADDITIONAL COMMENTS
Pt lives with her spouse in a private house, 6 RAS +HR and 1 flight within the home. Patient reports being independent in ADLs and ambulation prior to admission. Pt owns RW from prior injury.

## 2024-08-03 NOTE — H&P ADULT - ASSESSMENT
ADÁN PICKETT NASEEM is a  79 YO Female with PMH of HTN, DM who presents with lightheadedness, nausea and concern for dehydration. Patient states that for the past couple days, she has been having the symptoms, lightheaded like she might pass out, some nausea, decreased appetite and fatigue.

## 2024-08-03 NOTE — CONSULT NOTE ADULT - PROBLEM SELECTOR RECOMMENDATION 9
oral anti-diabetes regimen on hold during hospitalization  change mod dose admelog corrective scale coverage qac/qhs  cont cons cho diet  goal bg 100-180 in hosp setting

## 2024-08-03 NOTE — H&P ADULT - NEGATIVE NEUROLOGICAL SYMPTOMS
no transient paralysis/no paresthesias/no generalized seizures/no focal seizures/no syncope/no loss of sensation/no loss of consciousness

## 2024-08-03 NOTE — PATIENT PROFILE ADULT - FALL HARM RISK - UNIVERSAL INTERVENTIONS
Bed in lowest position, wheels locked, appropriate side rails in place/Call bell, personal items and telephone in reach/Instruct patient to call for assistance before getting out of bed or chair/Non-slip footwear when patient is out of bed/[place] to call system/Physically safe environment - no spills, clutter or unnecessary equipment/Purposeful Proactive Rounding/Room/bathroom lighting operational, light cord in reach

## 2024-08-03 NOTE — H&P ADULT - NSICDXPASTMEDICALHX_GEN_ALL_CORE_FT
PAST MEDICAL HISTORY:  CAD (coronary artery disease)     DM (diabetes mellitus)     Hypercholesteremia     Hypertension

## 2024-08-04 LAB
ALBUMIN SERPL ELPH-MCNC: 3.1 G/DL — LOW (ref 3.3–5)
ALP SERPL-CCNC: 44 U/L — SIGNIFICANT CHANGE UP (ref 40–120)
ALT FLD-CCNC: 35 U/L — SIGNIFICANT CHANGE UP (ref 12–78)
ANION GAP SERPL CALC-SCNC: 7 MMOL/L — SIGNIFICANT CHANGE UP (ref 5–17)
AST SERPL-CCNC: 32 U/L — SIGNIFICANT CHANGE UP (ref 15–37)
BILIRUB SERPL-MCNC: 0.4 MG/DL — SIGNIFICANT CHANGE UP (ref 0.2–1.2)
BUN SERPL-MCNC: 7 MG/DL — SIGNIFICANT CHANGE UP (ref 7–23)
CALCIUM SERPL-MCNC: 8.5 MG/DL — SIGNIFICANT CHANGE UP (ref 8.5–10.1)
CHLORIDE SERPL-SCNC: 110 MMOL/L — HIGH (ref 96–108)
CO2 SERPL-SCNC: 27 MMOL/L — SIGNIFICANT CHANGE UP (ref 22–31)
CREAT SERPL-MCNC: 1 MG/DL — SIGNIFICANT CHANGE UP (ref 0.5–1.3)
EGFR: 57 ML/MIN/1.73M2 — LOW
GLUCOSE BLDC GLUCOMTR-MCNC: 109 MG/DL — HIGH (ref 70–99)
GLUCOSE BLDC GLUCOMTR-MCNC: 144 MG/DL — HIGH (ref 70–99)
GLUCOSE BLDC GLUCOMTR-MCNC: 168 MG/DL — HIGH (ref 70–99)
GLUCOSE BLDC GLUCOMTR-MCNC: 311 MG/DL — HIGH (ref 70–99)
GLUCOSE SERPL-MCNC: 173 MG/DL — HIGH (ref 70–99)
MAGNESIUM SERPL-MCNC: 1.8 MG/DL — SIGNIFICANT CHANGE UP (ref 1.6–2.6)
POTASSIUM SERPL-MCNC: 3.7 MMOL/L — SIGNIFICANT CHANGE UP (ref 3.5–5.3)
POTASSIUM SERPL-SCNC: 3.7 MMOL/L — SIGNIFICANT CHANGE UP (ref 3.5–5.3)
PROT SERPL-MCNC: 5.9 G/DL — LOW (ref 6–8.3)
SODIUM SERPL-SCNC: 144 MMOL/L — SIGNIFICANT CHANGE UP (ref 135–145)

## 2024-08-04 RX ADMIN — Medication 5 MILLIGRAM(S): at 22:14

## 2024-08-04 RX ADMIN — Medication 650 MILLIGRAM(S): at 17:21

## 2024-08-04 RX ADMIN — LIDOCAINE 5% 5 MILLILITER(S): 5 CREAM TOPICAL at 20:49

## 2024-08-04 RX ADMIN — Medication 40 MILLIGRAM(S): at 22:14

## 2024-08-04 RX ADMIN — Medication 8: at 12:05

## 2024-08-04 RX ADMIN — LOSARTAN POTASSIUM 100 MILLIGRAM(S): 50 TABLET, FILM COATED ORAL at 05:33

## 2024-08-04 RX ADMIN — HEPARIN SODIUM 5000 UNIT(S): 1000 INJECTION, SOLUTION INTRAVENOUS; SUBCUTANEOUS at 05:32

## 2024-08-04 RX ADMIN — HEPARIN SODIUM 5000 UNIT(S): 1000 INJECTION, SOLUTION INTRAVENOUS; SUBCUTANEOUS at 17:10

## 2024-08-04 RX ADMIN — MAGNESIUM OXIDE 400 MILLIGRAM(S): 253 TABLET ORAL at 11:08

## 2024-08-04 RX ADMIN — AMLODIPINE BESYLATE 2.5 MILLIGRAM(S): 2.5 TABLET ORAL at 11:08

## 2024-08-04 NOTE — CARE COORDINATION ASSESSMENT. - NSCAREPROVIDERS_GEN_ALL_CORE_FT
CARE PROVIDERS:  Administration: Benja Corado  Admitting: Kal Mercado  Attending: Kal Mercado  Consultant: Alvin Orourke  Consultant: Brennan Pugh  Consultant: Perlman, Craig  Consultant: Meghna Greer  Covering Team: Sarita Blanton  Nurse: Hoda De Anda  Nurse: Hannah Erickson  Nurse: Nicole Genao  Outpatient Provider: Tashia Kraft  Override: Meliza Flynn  PCA/Nursing Assistant: Bassem Swain  PCA/Nursing Assistant: Birgit Hernández  PCA/Nursing Assistant: Elsy Cortés  : Sylvester Newton  Team: [facility], Team  UR// Supp. Assoc.: Brittany Sarkar

## 2024-08-04 NOTE — CARE COORDINATION ASSESSMENT. - OTHER PERTINENT REFERRAL INFORMATION
CM met with patient and spouse Kane at bedside to explain role and transitions of care. Patient lives with spouse in a private split-level house with 4 steps to get in 5 down and 5 to the second floor.  Patient was fully independent prior to admission.   is the caregiver.  No home care prior to admission and no DMEs.   Spouse will transport patient home when ready to be discharge.  No needs identified. Patient and spouse verbalized understanding of plans after discharge and are in agreement.  Resource folder left at bedside.  All questions answered to the best of my abilities.  CM remains available throughout the hospital stay.

## 2024-08-04 NOTE — CARE COORDINATION ASSESSMENT. - NSPASTMEDSURGHISTORY_GEN_ALL_CORE_FT
PAST MEDICAL & SURGICAL HISTORY:  Hypercholesteremia      DM (diabetes mellitus)      CAD (coronary artery disease)      Hypertension      No significant past surgical history

## 2024-08-05 LAB
ALBUMIN SERPL ELPH-MCNC: 3.3 G/DL — SIGNIFICANT CHANGE UP (ref 3.3–5)
ALP SERPL-CCNC: 49 U/L — SIGNIFICANT CHANGE UP (ref 40–120)
ALT FLD-CCNC: 39 U/L — SIGNIFICANT CHANGE UP (ref 12–78)
ANION GAP SERPL CALC-SCNC: 7 MMOL/L — SIGNIFICANT CHANGE UP (ref 5–17)
AST SERPL-CCNC: 35 U/L — SIGNIFICANT CHANGE UP (ref 15–37)
BASOPHILS # BLD AUTO: 0.08 K/UL — SIGNIFICANT CHANGE UP (ref 0–0.2)
BASOPHILS NFR BLD AUTO: 1.8 % — SIGNIFICANT CHANGE UP (ref 0–2)
BILIRUB SERPL-MCNC: 0.5 MG/DL — SIGNIFICANT CHANGE UP (ref 0.2–1.2)
BUN SERPL-MCNC: 10 MG/DL — SIGNIFICANT CHANGE UP (ref 7–23)
CALCIUM SERPL-MCNC: 8.9 MG/DL — SIGNIFICANT CHANGE UP (ref 8.5–10.1)
CHLORIDE SERPL-SCNC: 110 MMOL/L — HIGH (ref 96–108)
CO2 SERPL-SCNC: 26 MMOL/L — SIGNIFICANT CHANGE UP (ref 22–31)
CREAT SERPL-MCNC: 1.1 MG/DL — SIGNIFICANT CHANGE UP (ref 0.5–1.3)
EGFR: 51 ML/MIN/1.73M2 — LOW
EOSINOPHIL # BLD AUTO: 0.26 K/UL — SIGNIFICANT CHANGE UP (ref 0–0.5)
EOSINOPHIL NFR BLD AUTO: 6 % — SIGNIFICANT CHANGE UP (ref 0–6)
GLUCOSE BLDC GLUCOMTR-MCNC: 119 MG/DL — HIGH (ref 70–99)
GLUCOSE BLDC GLUCOMTR-MCNC: 154 MG/DL — HIGH (ref 70–99)
GLUCOSE BLDC GLUCOMTR-MCNC: 165 MG/DL — HIGH (ref 70–99)
GLUCOSE BLDC GLUCOMTR-MCNC: 258 MG/DL — HIGH (ref 70–99)
GLUCOSE SERPL-MCNC: 186 MG/DL — HIGH (ref 70–99)
HCT VFR BLD CALC: 36 % — SIGNIFICANT CHANGE UP (ref 34.5–45)
HGB BLD-MCNC: 11.7 G/DL — SIGNIFICANT CHANGE UP (ref 11.5–15.5)
IMM GRANULOCYTES NFR BLD AUTO: 0.5 % — SIGNIFICANT CHANGE UP (ref 0–0.9)
LYMPHOCYTES # BLD AUTO: 1.09 K/UL — SIGNIFICANT CHANGE UP (ref 1–3.3)
LYMPHOCYTES # BLD AUTO: 25.1 % — SIGNIFICANT CHANGE UP (ref 13–44)
MAGNESIUM SERPL-MCNC: 2.1 MG/DL — SIGNIFICANT CHANGE UP (ref 1.6–2.6)
MCHC RBC-ENTMCNC: 29.4 PG — SIGNIFICANT CHANGE UP (ref 27–34)
MCHC RBC-ENTMCNC: 32.5 GM/DL — SIGNIFICANT CHANGE UP (ref 32–36)
MCV RBC AUTO: 90.5 FL — SIGNIFICANT CHANGE UP (ref 80–100)
MONOCYTES # BLD AUTO: 0.52 K/UL — SIGNIFICANT CHANGE UP (ref 0–0.9)
MONOCYTES NFR BLD AUTO: 12 % — SIGNIFICANT CHANGE UP (ref 2–14)
NEUTROPHILS # BLD AUTO: 2.38 K/UL — SIGNIFICANT CHANGE UP (ref 1.8–7.4)
NEUTROPHILS NFR BLD AUTO: 54.6 % — SIGNIFICANT CHANGE UP (ref 43–77)
NRBC # BLD: 0 /100 WBCS — SIGNIFICANT CHANGE UP (ref 0–0)
PLATELET # BLD AUTO: 207 K/UL — SIGNIFICANT CHANGE UP (ref 150–400)
POTASSIUM SERPL-MCNC: 4 MMOL/L — SIGNIFICANT CHANGE UP (ref 3.5–5.3)
POTASSIUM SERPL-SCNC: 4 MMOL/L — SIGNIFICANT CHANGE UP (ref 3.5–5.3)
PROT SERPL-MCNC: 6.3 G/DL — SIGNIFICANT CHANGE UP (ref 6–8.3)
RBC # BLD: 3.98 M/UL — SIGNIFICANT CHANGE UP (ref 3.8–5.2)
RBC # FLD: 15.9 % — HIGH (ref 10.3–14.5)
SODIUM SERPL-SCNC: 143 MMOL/L — SIGNIFICANT CHANGE UP (ref 135–145)
WBC # BLD: 4.35 K/UL — SIGNIFICANT CHANGE UP (ref 3.8–10.5)
WBC # FLD AUTO: 4.35 K/UL — SIGNIFICANT CHANGE UP (ref 3.8–10.5)

## 2024-08-05 PROCEDURE — 74181 MRI ABDOMEN W/O CONTRAST: CPT | Mod: 26

## 2024-08-05 RX ORDER — LOSARTAN POTASSIUM 50 MG/1
100 TABLET, FILM COATED ORAL DAILY
Refills: 0 | Status: DISCONTINUED | OUTPATIENT
Start: 2024-08-05 | End: 2024-08-06

## 2024-08-05 RX ORDER — AMLODIPINE BESYLATE 2.5 MG/1
2.5 TABLET ORAL EVERY 24 HOURS
Refills: 0 | Status: DISCONTINUED | OUTPATIENT
Start: 2024-08-05 | End: 2024-08-06

## 2024-08-05 RX ORDER — HEPARIN SODIUM 1000 [USP'U]/ML
5000 INJECTION, SOLUTION INTRAVENOUS; SUBCUTANEOUS EVERY 8 HOURS
Refills: 0 | Status: DISCONTINUED | OUTPATIENT
Start: 2024-08-05 | End: 2024-08-06

## 2024-08-05 RX ADMIN — HEPARIN SODIUM 5000 UNIT(S): 1000 INJECTION, SOLUTION INTRAVENOUS; SUBCUTANEOUS at 21:44

## 2024-08-05 RX ADMIN — Medication 40 MILLIGRAM(S): at 21:44

## 2024-08-05 RX ADMIN — Medication 650 MILLIGRAM(S): at 11:30

## 2024-08-05 RX ADMIN — HEPARIN SODIUM 5000 UNIT(S): 1000 INJECTION, SOLUTION INTRAVENOUS; SUBCUTANEOUS at 14:07

## 2024-08-05 RX ADMIN — Medication 2: at 18:07

## 2024-08-05 RX ADMIN — Medication 650 MILLIGRAM(S): at 10:42

## 2024-08-05 RX ADMIN — Medication 5 MILLIGRAM(S): at 21:48

## 2024-08-05 RX ADMIN — LOSARTAN POTASSIUM 100 MILLIGRAM(S): 50 TABLET, FILM COATED ORAL at 05:31

## 2024-08-05 RX ADMIN — LIDOCAINE 5% 5 MILLILITER(S): 5 CREAM TOPICAL at 12:28

## 2024-08-05 RX ADMIN — MAGNESIUM OXIDE 400 MILLIGRAM(S): 253 TABLET ORAL at 11:28

## 2024-08-05 RX ADMIN — SODIUM CHLORIDE AND POTASSIUM CHLORIDE 75 MILLILITER(S): 150; 450 INJECTION, SOLUTION INTRAVENOUS at 18:07

## 2024-08-05 RX ADMIN — Medication 2: at 10:42

## 2024-08-05 RX ADMIN — AMLODIPINE BESYLATE 2.5 MILLIGRAM(S): 2.5 TABLET ORAL at 11:28

## 2024-08-05 RX ADMIN — SODIUM CHLORIDE AND POTASSIUM CHLORIDE 75 MILLILITER(S): 150; 450 INJECTION, SOLUTION INTRAVENOUS at 02:15

## 2024-08-05 RX ADMIN — SODIUM CHLORIDE AND POTASSIUM CHLORIDE 75 MILLILITER(S): 150; 450 INJECTION, SOLUTION INTRAVENOUS at 12:27

## 2024-08-05 RX ADMIN — HEPARIN SODIUM 5000 UNIT(S): 1000 INJECTION, SOLUTION INTRAVENOUS; SUBCUTANEOUS at 05:31

## 2024-08-05 NOTE — DIETITIAN INITIAL EVALUATION ADULT - PERTINENT MEDS FT
MEDICATIONS  (STANDING):  amLODIPine   Tablet 2.5 milliGRAM(s) Oral every 24 hours  dextrose 5%. 1000 milliLiter(s) (50 mL/Hr) IV Continuous <Continuous>  dextrose 5%. 1000 milliLiter(s) (100 mL/Hr) IV Continuous <Continuous>  dextrose 50% Injectable 25 Gram(s) IV Push once  dextrose 50% Injectable 25 Gram(s) IV Push once  dextrose 50% Injectable 12.5 Gram(s) IV Push once  dextrose Oral Gel 15 Gram(s) Oral once  glucagon  Injectable 1 milliGRAM(s) IntraMuscular once  heparin   Injectable 5000 Unit(s) SubCutaneous every 8 hours  insulin lispro (ADMELOG) corrective regimen sliding scale   SubCutaneous three times a day before meals  insulin lispro (ADMELOG) corrective regimen sliding scale   SubCutaneous at bedtime  losartan 100 milliGRAM(s) Oral daily  magnesium oxide 400 milliGRAM(s) Oral daily  simvastatin 40 milliGRAM(s) Oral at bedtime  sodium chloride 0.45% with potassium chloride 20 mEq/L 1000 milliLiter(s) (75 mL/Hr) IV Continuous <Continuous>    MEDICATIONS  (PRN):  acetaminophen     Tablet .. 650 milliGRAM(s) Oral every 6 hours PRN Temp greater or equal to 38C (100.4F), Mild Pain (1 - 3)  aluminum hydroxide/magnesium hydroxide/simethicone Suspension 30 milliLiter(s) Oral every 4 hours PRN Dyspepsia  lidocaine 2% Viscous 5 milliLiter(s) Swish and Spit daily PRN Mouth Care  meclizine 12.5 milliGRAM(s) Oral three times a day PRN Dizziness  ondansetron Injectable 4 milliGRAM(s) IV Push every 8 hours PRN Nausea and/or Vomiting  zolpidem 5 milliGRAM(s) Oral at bedtime PRN Insomnia  zolpidem 5 milliGRAM(s) Oral at bedtime PRN Insomnia

## 2024-08-05 NOTE — DIETITIAN INITIAL EVALUATION ADULT - NUTRITIONGOAL OUTCOME1
Pt will consume a diet with a consistent amount of complex carbs while limiting simple carbs using The Plate Method.

## 2024-08-05 NOTE — PROGRESS NOTE ADULT - PROBLEM SELECTOR PLAN 4
check urine cytology  urology eval out patient  d/w dr. de luna

## 2024-08-05 NOTE — DIETITIAN INITIAL EVALUATION ADULT - OTHER INFO
79 y/o female adm with dehydration. PMH HLD, DM, HTN, CAD. Pt n/a for interview at time of visit. Written information re: The Plate Method with name and number of RD left at pt's bedside. EMR reviewed.

## 2024-08-05 NOTE — PROGRESS NOTE ADULT - PROBLEM SELECTOR PLAN 1
oral anti-diabetes regimen on hold during hospitalization  change mod dose admelog corrective scale coverage qac/qhs  cont cons cho diet  goal bg 100-180 in hosp setting.
insulin scale  hold OHA
insulin scale  hold OHA
insulin scale  hold OHA  endo eval

## 2024-08-05 NOTE — DIETITIAN INITIAL EVALUATION ADULT - NAME AND PHONE
Evelyn Mercado, MS, RD, CDN, [facility]

## 2024-08-05 NOTE — PROGRESS NOTE ADULT - REASON FOR ADMISSION
Light headed ness
intractable nausea
Light headed ness

## 2024-08-05 NOTE — PROGRESS NOTE ADULT - ASSESSMENT
ADÁN PICKETT NASEEM is a  77 YO Female with PMH of HTN, DM who presents with lightheadedness, nausea and concern for dehydration. Patient states that for the past couple days, she has been having the symptoms, lightheaded like she might pass out, some nausea, decreased appetite and fatigue.
Pancreatic head calcification versus choledocholithiasis  Nausea    Ct noted   zofran PRN  diet as tolerated  monitor for abd. pain  awaiting MRCP
Pancreatic head calcification versus choledocholithiasis  Nausea    Ct noted   zofran PRN  diet as tolerated  monitor for abd. pain  will need MRCP- ordered   d/w pt and family  will follow 
ADÁN PICKETT NASEEM is a  77 YO Female with PMH of HTN, DM who presents with lightheadedness, nausea and concern for dehydration. Patient states that for the past couple days, she has been having the symptoms, lightheaded like she might pass out, some nausea, decreased appetite and fatigue.
Regular rate & rhythm, normal S1, S2; no murmurs, gallops or rubs; no S3, S4

## 2024-08-05 NOTE — DIETITIAN INITIAL EVALUATION ADULT - PERTINENT LABORATORY DATA
08-05    143  |  110<H>  |  10  ----------------------------<  186<H>  4.0   |  26  |  1.10    Ca    8.9      05 Aug 2024 09:08  Mg     2.1     08-05    TPro  6.3  /  Alb  3.3  /  TBili  0.5  /  DBili  x   /  AST  35  /  ALT  39  /  AlkPhos  49  08-05  POCT Blood Glucose.: 258 mg/dL (08-05-24 @ 12:35)  A1C with Estimated Average Glucose Result: 7.9 % (08-03-24 @ 08:30)

## 2024-08-05 NOTE — PHARMACOTHERAPY INTERVENTION NOTE - COMMENTS
Medication reconciliation completed on admission with patient and [facility]. Updated medication list in OMR/prescription writer.    Home Medications:  glimepiride 4 mg oral tablet: 1 tab(s) orally once a day (03 Aug 2024 15:56)  losartan 50 mg oral tablet: 1 tab(s) orally once a day (03 Aug 2024 15:56)  metformin 500 mg oral tablet: 1 tab(s) orally 2 times a day (03 Aug 2024 15:58)  pioglitazone 30 mg oral tablet: 1 tab(s) orally once a day (03 Aug 2024 15:56)  simvastatin 40 mg oral tablet: 1 tab(s) orally once a day (at bedtime) (03 Aug 2024 15:58)  zolpidem 10 mg oral tablet: 1 tab(s) orally once a day (at bedtime) (03 Aug 2024 15:58)    Solange Dunlap, PharmD  Clinical Pharmacy Specialist   [phone] or Teams
Admission counseling - discussed current and new medications with the patient at bedside including indications, directions, and adverse effects to monitor. Patient verbalized understanding and had no further questions.
Admission counseling - discussed current and new medications with the patient at bedside including indications, directions, and adverse effects to monitor. Patient verbalized understanding and had no further questions.

## 2024-08-05 NOTE — PROGRESS NOTE ADULT - SUBJECTIVE AND OBJECTIVE BOX
Date of Service 08-05-24 @ 19:02    Patient is a 78y old  Female who presents with a chief complaint of nausea     (05 Aug 2024 16:32)      INTERVAL /OVERNIGHT EVENTS:  waiting for MR result    MEDICATIONS  (STANDING):  amLODIPine   Tablet 2.5 milliGRAM(s) Oral every 24 hours  dextrose 5%. 1000 milliLiter(s) (100 mL/Hr) IV Continuous <Continuous>  dextrose 5%. 1000 milliLiter(s) (50 mL/Hr) IV Continuous <Continuous>  dextrose 50% Injectable 25 Gram(s) IV Push once  dextrose 50% Injectable 12.5 Gram(s) IV Push once  dextrose 50% Injectable 25 Gram(s) IV Push once  dextrose Oral Gel 15 Gram(s) Oral once  glucagon  Injectable 1 milliGRAM(s) IntraMuscular once  heparin   Injectable 5000 Unit(s) SubCutaneous every 8 hours  insulin lispro (ADMELOG) corrective regimen sliding scale   SubCutaneous three times a day before meals  insulin lispro (ADMELOG) corrective regimen sliding scale   SubCutaneous at bedtime  losartan 100 milliGRAM(s) Oral daily  magnesium oxide 400 milliGRAM(s) Oral daily  simvastatin 40 milliGRAM(s) Oral at bedtime  sodium chloride 0.45% with potassium chloride 20 mEq/L 1000 milliLiter(s) (75 mL/Hr) IV Continuous <Continuous>    MEDICATIONS  (PRN):  acetaminophen     Tablet .. 650 milliGRAM(s) Oral every 6 hours PRN Temp greater or equal to 38C (100.4F), Mild Pain (1 - 3)  aluminum hydroxide/magnesium hydroxide/simethicone Suspension 30 milliLiter(s) Oral every 4 hours PRN Dyspepsia  lidocaine 2% Viscous 5 milliLiter(s) Swish and Spit daily PRN Mouth Care  meclizine 12.5 milliGRAM(s) Oral three times a day PRN Dizziness  ondansetron Injectable 4 milliGRAM(s) IV Push every 8 hours PRN Nausea and/or Vomiting  zolpidem 5 milliGRAM(s) Oral at bedtime PRN Insomnia  zolpidem 5 milliGRAM(s) Oral at bedtime PRN Insomnia      Allergies    iodine (Unknown)    Intolerances        REVIEW OF SYSTEMS:  CONSTITUTIONAL: No fever, weight loss, or fatigue  EYES: No eye pain, visual disturbances, or discharge  ENMT:  No difficulty hearing, tinnitus, vertigo; No sinus or throat pain  NECK: No pain or stiffness  RESPIRATORY: No cough, wheezing, chills or hemoptysis; No shortness of breath  CARDIOVASCULAR: No chest pain, palpitations, dizziness, or leg swelling  GASTROINTESTINAL: No abdominal or epigastric pain. No nausea, vomiting, or hematemesis; No diarrhea or constipation. No melena or hematochezia.  GENITOURINARY: No dysuria, frequency, hematuria, or incontinence  NEUROLOGICAL: No headaches, memory loss, loss of strength, numbness, or tremors  SKIN: No itching, burning, rashes, or lesions   LYMPH NODES: No enlarged glands  ENDOCRINE: No heat or cold intolerance; No hair loss; No polydipsia or polyuria  MUSCULOSKELETAL: No joint pain or swelling; No muscle, back, or extremity pain  PSYCHIATRIC: No depression, anxiety, mood swings, or difficulty sleeping  HEME/LYMPH: No easy bruising, or bleeding gums  ALLERGY AND IMMUNOLOGIC: No hives or eczema    Vital Signs Last 24 Hrs  T(C): 37.1 (05 Aug 2024 11:06), Max: 37.1 (05 Aug 2024 11:06)  T(F): 98.8 (05 Aug 2024 11:06), Max: 98.8 (05 Aug 2024 11:06)  HR: 60 (05 Aug 2024 11:06) (60 - 76)  BP: 173/65 (05 Aug 2024 11:06) (135/69 - 173/65)  BP(mean): --  RR: 17 (05 Aug 2024 11:06) (17 - 18)  SpO2: 97% (05 Aug 2024 11:06) (95% - 97%)    Parameters below as of 05 Aug 2024 11:06  Patient On (Oxygen Delivery Method): room air        PHYSICAL EXAM:  GENERAL: NAD, well-groomed, well-developed  HEAD:  Atraumatic, Normocephalic  EYES: EOMI, PERRLA, conjunctiva and sclera clear  ENMT: No tonsillar erythema, exudates, or enlargement; Moist mucous membranes, Good dentition, No lesions  NECK: Supple, No JVD, Normal thyroid  NERVOUS SYSTEM:  Alert & Oriented X3, Good concentration; Motor Strength 5/5 B/L upper and lower extremities; DTRs 2+ intact and symmetric  CHEST/LUNG: Clear to auscultation bilaterally; No rales, rhonchi, wheezing, or rubs  HEART: Regular rate and rhythm; No murmurs, rubs, or gallops  ABDOMEN: Soft, Nontender, Nondistended; Bowel sounds present  EXTREMITIES:  2+ Peripheral Pulses, No clubbing, cyanosis, or edema  LYMPH: No lymphadenopathy noted  SKIN: No rashes or lesions    LABS:                        11.7   4.35  )-----------( 207      ( 05 Aug 2024 09:08 )             36.0     05 Aug 2024 09:08    143    |  110    |  10     ----------------------------<  186    4.0     |  26     |  1.10     Ca    8.9        05 Aug 2024 09:08  Mg     2.1       05 Aug 2024 09:08    TPro  6.3    /  Alb  3.3    /  TBili  0.5    /  DBili  x      /  AST  35     /  ALT  39     /  AlkPhos  49     05 Aug 2024 09:08      Urinalysis Basic - ( 05 Aug 2024 09:08 )    Color: x / Appearance: x / SG: x / pH: x  Gluc: 186 mg/dL / Ketone: x  / Bili: x / Urobili: x   Blood: x / Protein: x / Nitrite: x   Leuk Esterase: x / RBC: x / WBC x   Sq Epi: x / Non Sq Epi: x / Bacteria: x      CAPILLARY BLOOD GLUCOSE      POCT Blood Glucose.: 165 mg/dL (05 Aug 2024 17:18)  POCT Blood Glucose.: 258 mg/dL (05 Aug 2024 12:35)  POCT Blood Glucose.: 154 mg/dL (05 Aug 2024 09:47)  POCT Blood Glucose.: 168 mg/dL (04 Aug 2024 21:19)      RADIOLOGY & ADDITIONAL TESTS:    Notes Reviewed:  [x ] YES  [ ] NO    Care Discussed with Consultants/Other Providers [x ] YES  [ ] NO
[facility]    Alvin Sharad Villa NP    [address] [place] [address]  [phone]    INTERVAL HPI/ OVERNIGHT EVENTS:  pt s/e family at bedside  denies having abdominal pain  nausea is better      Allergies:  iodine (Unknown)      Medications:  acetaminophen     Tablet .. 650 milliGRAM(s) Oral every 6 hours PRN  aluminum hydroxide/magnesium hydroxide/simethicone Suspension 30 milliLiter(s) Oral every 4 hours PRN  amLODIPine   Tablet 2.5 milliGRAM(s) Oral every 24 hours  dextrose 5%. 1000 milliLiter(s) IV Continuous <Continuous>  dextrose 5%. 1000 milliLiter(s) IV Continuous <Continuous>  dextrose 50% Injectable 25 Gram(s) IV Push once  dextrose 50% Injectable 25 Gram(s) IV Push once  dextrose 50% Injectable 12.5 Gram(s) IV Push once  dextrose Oral Gel 15 Gram(s) Oral once  glucagon  Injectable 1 milliGRAM(s) IntraMuscular once  heparin   Injectable 5000 Unit(s) SubCutaneous every 12 hours  insulin lispro (ADMELOG) corrective regimen sliding scale   SubCutaneous three times a day before meals  insulin lispro (ADMELOG) corrective regimen sliding scale   SubCutaneous at bedtime  lidocaine 2% Viscous 5 milliLiter(s) Swish and Spit daily PRN  losartan 100 milliGRAM(s) Oral daily  magnesium oxide 400 milliGRAM(s) Oral daily  meclizine 12.5 milliGRAM(s) Oral three times a day PRN  ondansetron Injectable 4 milliGRAM(s) IV Push every 8 hours PRN  simvastatin 40 milliGRAM(s) Oral at bedtime  sodium chloride 0.45% with potassium chloride 20 mEq/L 1000 milliLiter(s) IV Continuous <Continuous>  zolpidem 5 milliGRAM(s) Oral at bedtime PRN  zolpidem 5 milliGRAM(s) Oral at bedtime PRN      PMHX/PSHX:      Family history:      Social History:     ROS:     General:  No wt loss, fevers, chills, night sweats, fatigue,   Eyes:  Good vision, no reported pain  ENT:  No sore throat, pain, runny nose, dysphagia  CV:  No pain, palpitations, hypo/hypertension  Resp:  No dyspnea, cough, tachypnea, wheezing  GI:  No pain, +nausea, No vomiting, No diarrhea, No constipation, No weight loss, No fever, No pruritis, No rectal bleeding, No tarry stools, No dysphagia,  :  No pain, bleeding, incontinence, nocturia  Muscle:  No pain, weakness  Neuro:  No weakness, tingling, memory problems  Psych:  No fatigue, insomnia, mood problems, depression  Endocrine:  No polyuria, polydipsia, cold/heat intolerance  Heme:  No petechiae, ecchymosis, easy bruisability  Skin:  No rash, tattoos, scars, edema      PHYSICAL EXAM:   Vital Signs Last 24 Hrs  T(C): 36.5 (04 Aug 2024 11:21), Max: 36.7 (03 Aug 2024 20:13)  T(F): 97.7 (04 Aug 2024 11:21), Max: 98.1 (03 Aug 2024 20:13)  HR: 73 (04 Aug 2024 11:) (69 - 73)  BP: 152/69 (04 Aug 2024 11:21) (146/62 - 156/76)  BP(mean): --  RR: 18 (04 Aug 2024 11:21) (17 - 18)  SpO2: 95% (04 Aug 2024 11:21) (95% - 97%)    Parameters below as of 04 Aug 2024 11:21  Patient On (Oxygen Delivery Method): room air      Daily     Daily Weight in k.2 (03 Aug 2024 05:29)    GENERAL:  Appears stated age, well-groomed, well-nourished, no distress  HEENT:  NC/AT,  conjunctivae clear and pink, no thyromegaly, nodules, adenopathy, no JVD, sclera -anicteric  CHEST:  Full & symmetric excursion, no increased effort, breath sounds clear  HEART:  Regular rhythm, S1, S2, no murmur/rub/S3/S4, no abdominal bruit, no edema  ABDOMEN:  Soft, non-tender, non-distended, normoactive bowel sounds,  no masses ,no hepato-splenomegaly, no signs of chronic liver disease  EXTEREMITIES:  no cyanosis,clubbing or edema  SKIN:  No rash/erythema/ecchymoses/petechiae/wounds/abscess/warm/dry  NEURO:  Alert, oriented, no asterixis, no tremor, no encephalopathy    LABS:                      11.9   5.26  )-----------( 230      ( 02 Aug 2024 16:09 )             35.8     08-04    144  |  110<H>  |  7   ----------------------------<  173<H>  3.7   |  27  |  1.00    Ca    8.5      04 Aug 2024 08:08  Mg     1.8     08-04    TPro  5.9<L>  /  Alb  3.1<L>  /  TBili  0.4  /  DBili  x   /  AST  32  /  ALT  35  /  AlkPhos  44  08-04        LIVER FUNCTIONS - ( 02 Aug 2024 16:09 )  Alb: 3.5 g/dL / Pro: 6.8 g/dL / ALK PHOS: 57 U/L / ALT: 43 U/L / AST: 32 U/L / GGT: x             Urinalysis Basic - ( 02 Aug 2024 17:22 )    Color: Yellow / Appearance: Clear / S.003 / pH: x  Gluc: x / Ketone: Negative mg/dL  / Bili: Negative / Urobili: 0.2 mg/dL   Blood: x / Protein: Negative mg/dL / Nitrite: Negative   Leuk Esterase: Moderate / RBC: 0 /HPF / WBC 6 /HPF   Sq Epi: x / Non Sq Epi: x / Bacteria: Occasional /HPF          Imaging:      < from: CT Abdomen and Pelvis No Cont (24 @ 19:53) >    ACC: [id number] EXAM:  CT ABDOMEN AND PELVIS   ORDERED BY:  JALEESA GONZALEZ     PROCEDURE DATE:  2024          INTERPRETATION:  CLINICAL INFORMATION: Persistent nausea.    COMPARISON: None.    CONTRAST/COMPLICATIONS:  IV Contrast: NONE  Oral Contrast:NONE  Complications: None reported at time of study completion    PROCEDURE:  CT of the Abdomen and Pelvis was performed.  Sagittal and coronal reformats were performed.    FINDINGS:  LOWER CHEST: Large hiatal hernia. Coronary artery calcifications.    LIVER: 1.8 cm cyst hepatic segment 7.  BILE DUCTS: Normal caliber.  GALLBLADDER: Within normal limits.  SPLEEN: Within normal limits.  PANCREAS: Partial fatty replacement. 3 mm calcification in the head of   the pancreas or within the distal CBD.  ADRENALS: Within normal limits.  KIDNEYS/URETERS: Bilateral renal cortical thinning. Left lower pole   parapelvic renal cysts.    BLADDER: 1.8 cm polypoid soft tissue polypoid mass in the right trigone   consistent with urothelial neoplasm.  REPRODUCTIVE ORGANS: Hysterectomy.    BOWEL: No bowel obstruction. Appendix is normal. Colonic diverticulosis.   Large hiatal hernia.  PERITONEUM/RETROPERITONEUM: Within normal limits.  VESSELS: Severe atherosclerotic calcifications.  LYMPH NODES: No lymphadenopathy.  ABDOMINAL WALL: Small fat-containing umbilical hernia.  BONES: Degenerative changes. Osteopenia.    IMPRESSION:  Large hiatal hernia.    1.8 cm polypoid soft tissue polypoid mass in the right trigone consistent   with urothelial urinary bladder neoplasm. Recommend urology consultation.    Pancreatic head calcification versus choledocholithiasis. Recommend MRCP.    Additional findings noted above.    --- End of Report ---            JT GREWAL MD; Attending Radiologist  This document has been electronically signed. Aug  2 2024  8:27PM    < end of copied text >          
Date of Service 08-03-24 @ 19:41    Patient is a 78y old  Female who presents with a chief complaint of nausea    INTERVAL /OVERNIGHT EVENTS: nausea improved    MEDICATIONS  (STANDING):  amLODIPine   Tablet 2.5 milliGRAM(s) Oral every 24 hours  dextrose 5%. 1000 milliLiter(s) (50 mL/Hr) IV Continuous <Continuous>  dextrose 5%. 1000 milliLiter(s) (100 mL/Hr) IV Continuous <Continuous>  dextrose 50% Injectable 25 Gram(s) IV Push once  dextrose 50% Injectable 25 Gram(s) IV Push once  dextrose 50% Injectable 12.5 Gram(s) IV Push once  dextrose Oral Gel 15 Gram(s) Oral once  glucagon  Injectable 1 milliGRAM(s) IntraMuscular once  heparin   Injectable 5000 Unit(s) SubCutaneous every 12 hours  insulin lispro (ADMELOG) corrective regimen sliding scale   SubCutaneous three times a day before meals  insulin lispro (ADMELOG) corrective regimen sliding scale   SubCutaneous at bedtime  losartan 100 milliGRAM(s) Oral daily  magnesium oxide 400 milliGRAM(s) Oral daily  simvastatin 40 milliGRAM(s) Oral at bedtime  sodium chloride 0.45% with potassium chloride 20 mEq/L 1000 milliLiter(s) (75 mL/Hr) IV Continuous <Continuous>    MEDICATIONS  (PRN):  acetaminophen     Tablet .. 650 milliGRAM(s) Oral every 6 hours PRN Temp greater or equal to 38C (100.4F), Mild Pain (1 - 3)  aluminum hydroxide/magnesium hydroxide/simethicone Suspension 30 milliLiter(s) Oral every 4 hours PRN Dyspepsia  lidocaine 2% Viscous 5 milliLiter(s) Swish and Spit daily PRN Mouth Care  meclizine 12.5 milliGRAM(s) Oral three times a day PRN Dizziness  ondansetron Injectable 4 milliGRAM(s) IV Push every 8 hours PRN Nausea and/or Vomiting  zolpidem 5 milliGRAM(s) Oral at bedtime PRN Insomnia  zolpidem 5 milliGRAM(s) Oral at bedtime PRN Insomnia      Allergies    iodine (Unknown)    Intolerances        REVIEW OF SYSTEMS:  CONSTITUTIONAL: No fever, weight loss, or fatigue  EYES: No eye pain, visual disturbances, or discharge  ENMT:  No difficulty hearing, tinnitus, vertigo; No sinus or throat pain  NECK: No pain or stiffness  RESPIRATORY: No cough, wheezing, chills or hemoptysis; No shortness of breath  CARDIOVASCULAR: No chest pain, palpitations, dizziness, or leg swelling  GASTROINTESTINAL: No abdominal or epigastric pain. + nausea  GENITOURINARY: No dysuria, frequency, hematuria, or incontinence  NEUROLOGICAL: No headaches, memory loss, loss of strength, numbness, or tremors  SKIN: No itching, burning, rashes, or lesions   LYMPH NODES: No enlarged glands  ENDOCRINE: No heat or cold intolerance; No hair loss; No polydipsia or polyuria  MUSCULOSKELETAL: No joint pain or swelling; No muscle, back, or extremity pain  PSYCHIATRIC: No depression, anxiety, mood swings, or difficulty sleeping  HEME/LYMPH: No easy bruising, or bleeding gums  ALLERGY AND IMMUNOLOGIC: No hives or eczema    Vital Signs Last 24 Hrs  T(C): 36.5 (03 Aug 2024 11:15), Max: 36.7 (02 Aug 2024 23:22)  T(F): 97.7 (03 Aug 2024 11:15), Max: 98 (02 Aug 2024 23:22)  HR: 72 (03 Aug 2024 11:15) (67 - 85)  BP: 149/68 (03 Aug 2024 11:15) (139/65 - 174/77)  BP(mean): --  RR: 18 (03 Aug 2024 11:15) (16 - 18)  SpO2: 98% (03 Aug 2024 11:15) (95% - 98%)    Parameters below as of 03 Aug 2024 11:15  Patient On (Oxygen Delivery Method): room air        PHYSICAL EXAM:  GENERAL: NAD, well-groomed, well-developed  HEAD:  Atraumatic, Normocephalic  EYES: EOMI, PERRLA, conjunctiva and sclera clear  ENMT: No tonsillar erythema, exudates, or enlargement; Moist mucous membranes, Good dentition, No lesions  NECK: Supple, No JVD, Normal thyroid  NERVOUS SYSTEM:  Alert & Oriented X3, Good concentration; Motor Strength 5/5 B/L upper and lower extremities; DTRs 2+ intact and symmetric  CHEST/LUNG: Clear to auscultation bilaterally; No rales, rhonchi, wheezing, or rubs  HEART: Regular rate and rhythm; No murmurs, rubs, or gallops  ABDOMEN: Soft, Nontender, Nondistended; Bowel sounds present  EXTREMITIES:  2+ Peripheral Pulses, No clubbing, cyanosis, or edema  LYMPH: No lymphadenopathy noted  SKIN: No rashes or lesions    LABS:    03 Aug 2024 14:23    142    |  111    |  9      ----------------------------<  260    3.4     |  25     |  1.20     Ca    8.3        03 Aug 2024 14:23        Urinalysis Basic - ( 03 Aug 2024 14:23 )    Color: x / Appearance: x / SG: x / pH: x  Gluc: 260 mg/dL / Ketone: x  / Bili: x / Urobili: x   Blood: x / Protein: x / Nitrite: x   Leuk Esterase: x / RBC: x / WBC x   Sq Epi: x / Non Sq Epi: x / Bacteria: x      CAPILLARY BLOOD GLUCOSE      POCT Blood Glucose.: 215 mg/dL (03 Aug 2024 17:25)  POCT Blood Glucose.: 254 mg/dL (03 Aug 2024 11:48)  POCT Blood Glucose.: 147 mg/dL (03 Aug 2024 05:22)  POCT Blood Glucose.: 146 mg/dL (02 Aug 2024 20:48)      RADIOLOGY & ADDITIONAL TESTS:    Notes Reviewed:  [x ] YES  [ ] NO    Care Discussed with Consultants/Other Providers [x ] YES  [ ] NO
[facility]  Leeroy Novak PA-C  [address] [place] [address]  [phone]      INTERVAL HPI/OVERNIGHT EVENTS:  Awaiting MRCP result    MEDICATIONS  (STANDING):  amLODIPine   Tablet 2.5 milliGRAM(s) Oral every 24 hours  dextrose 5%. 1000 milliLiter(s) (100 mL/Hr) IV Continuous <Continuous>  dextrose 5%. 1000 milliLiter(s) (50 mL/Hr) IV Continuous <Continuous>  dextrose 50% Injectable 25 Gram(s) IV Push once  dextrose 50% Injectable 25 Gram(s) IV Push once  dextrose 50% Injectable 12.5 Gram(s) IV Push once  dextrose Oral Gel 15 Gram(s) Oral once  glucagon  Injectable 1 milliGRAM(s) IntraMuscular once  heparin   Injectable 5000 Unit(s) SubCutaneous every 8 hours  insulin lispro (ADMELOG) corrective regimen sliding scale   SubCutaneous three times a day before meals  insulin lispro (ADMELOG) corrective regimen sliding scale   SubCutaneous at bedtime  losartan 100 milliGRAM(s) Oral daily  magnesium oxide 400 milliGRAM(s) Oral daily  simvastatin 40 milliGRAM(s) Oral at bedtime  sodium chloride 0.45% with potassium chloride 20 mEq/L 1000 milliLiter(s) (75 mL/Hr) IV Continuous <Continuous>    MEDICATIONS  (PRN):  acetaminophen     Tablet .. 650 milliGRAM(s) Oral every 6 hours PRN Temp greater or equal to 38C (100.4F), Mild Pain (1 - 3)  aluminum hydroxide/magnesium hydroxide/simethicone Suspension 30 milliLiter(s) Oral every 4 hours PRN Dyspepsia  lidocaine 2% Viscous 5 milliLiter(s) Swish and Spit daily PRN Mouth Care  meclizine 12.5 milliGRAM(s) Oral three times a day PRN Dizziness  ondansetron Injectable 4 milliGRAM(s) IV Push every 8 hours PRN Nausea and/or Vomiting  zolpidem 5 milliGRAM(s) Oral at bedtime PRN Insomnia  zolpidem 5 milliGRAM(s) Oral at bedtime PRN Insomnia      Allergies    iodine (Unknown)        PHYSICAL EXAM:   Vital Signs:  Vital Signs Last 24 Hrs  T(C): 37.1 (05 Aug 2024 11:06), Max: 37.1 (05 Aug 2024 11:06)  T(F): 98.8 (05 Aug 2024 11:06), Max: 98.8 (05 Aug 2024 11:06)  HR: 60 (05 Aug 2024 11:06) (60 - 76)  BP: 173/65 (05 Aug 2024 11:06) (135/69 - 173/65)  BP(mean): --  RR: 17 (05 Aug 2024 11:06) (17 - 18)  SpO2: 97% (05 Aug 2024 11:) (95% - 97%)    Parameters below as of 05 Aug 2024 11:06  Patient On (Oxygen Delivery Method): room air      Daily     Daily Weight in k (05 Aug 2024 05:06)    GENERAL:  Appears stated age  HEENT:  NC/AT  CHEST:  Full & symmetric excursion  HEART:  Regular rhythm  ABDOMEN:  Soft, non-tender, non-distended  EXTEREMITIES:  no cyanosis  SKIN:  No rash  NEURO:  Alert      LABS:                        11.7   4.35  )-----------( 207      ( 05 Aug 2024 09:08 )             36.0     08-05    143  |  110<H>  |  10  ----------------------------<  186<H>  4.0   |  26  |  1.10    Ca    8.9      05 Aug 2024 09:08  Mg     2.1     08-05    TPro  6.3  /  Alb  3.3  /  TBili  0.5  /  DBili  x   /  AST  35  /  ALT  39  /  AlkPhos  49  08-05      Urinalysis Basic - ( 05 Aug 2024 09:08 )    Color: x / Appearance: x / SG: x / pH: x  Gluc: 186 mg/dL / Ketone: x  / Bili: x / Urobili: x   Blood: x / Protein: x / Nitrite: x   Leuk Esterase: x / RBC: x / WBC x   Sq Epi: x / Non Sq Epi: x / Bacteria: x  
CAPILLARY BLOOD GLUCOSE      POCT Blood Glucose.: 168 mg/dL (04 Aug 2024 21:19)  POCT Blood Glucose.: 109 mg/dL (04 Aug 2024 17:05)  POCT Blood Glucose.: 311 mg/dL (04 Aug 2024 11:43)      Vital Signs Last 24 Hrs  T(C): 36.9 (05 Aug 2024 05:06), Max: 36.9 (05 Aug 2024 05:06)  T(F): 98.4 (05 Aug 2024 05:06), Max: 98.4 (05 Aug 2024 05:06)  HR: 76 (05 Aug 2024 05:06) (66 - 76)  BP: 167/77 (05 Aug 2024 05:06) (135/69 - 167/77)  BP(mean): --  RR: 18 (05 Aug 2024 05:06) (18 - 18)  SpO2: 95% (05 Aug 2024 05:06) (95% - 95%)    Parameters below as of 05 Aug 2024 05:06  Patient On (Oxygen Delivery Method): room air        General: WN/WD NAD  Respiratory: CTA B/L  CV: RRR, S1S2, no murmurs, rubs or gallops  Abdominal: Soft, NT, ND +BS, Last BM  Extremities: No edema, + peripheral pulses     08-04    144  |  110<H>  |  7   ----------------------------<  173<H>  3.7   |  27  |  1.00    Ca    8.5      04 Aug 2024 08:08  Mg     1.8     08-04    TPro  5.9<L>  /  Alb  3.1<L>  /  TBili  0.4  /  DBili  x   /  AST  32  /  ALT  35  /  AlkPhos  44  08-04      dextrose 50% Injectable 25 Gram(s) IV Push once  dextrose 50% Injectable 25 Gram(s) IV Push once  dextrose 50% Injectable 12.5 Gram(s) IV Push once  dextrose Oral Gel 15 Gram(s) Oral once  glucagon  Injectable 1 milliGRAM(s) IntraMuscular once  insulin lispro (ADMELOG) corrective regimen sliding scale   SubCutaneous three times a day before meals  insulin lispro (ADMELOG) corrective regimen sliding scale   SubCutaneous at bedtime  simvastatin 40 milliGRAM(s) Oral at bedtime  
Patient is a 78y old  Female who presents with a chief complaint of Light headed ness (04 Aug 2024 13:46)      INTERVAL OVER NIGHT EVENTS:    MEDICATIONS  (STANDING):  amLODIPine   Tablet 2.5 milliGRAM(s) Oral every 24 hours  dextrose 5%. 1000 milliLiter(s) (100 mL/Hr) IV Continuous <Continuous>  dextrose 5%. 1000 milliLiter(s) (50 mL/Hr) IV Continuous <Continuous>  dextrose 50% Injectable 25 Gram(s) IV Push once  dextrose 50% Injectable 25 Gram(s) IV Push once  dextrose 50% Injectable 12.5 Gram(s) IV Push once  dextrose Oral Gel 15 Gram(s) Oral once  glucagon  Injectable 1 milliGRAM(s) IntraMuscular once  heparin   Injectable 5000 Unit(s) SubCutaneous every 12 hours  insulin lispro (ADMELOG) corrective regimen sliding scale   SubCutaneous three times a day before meals  insulin lispro (ADMELOG) corrective regimen sliding scale   SubCutaneous at bedtime  losartan 100 milliGRAM(s) Oral daily  magnesium oxide 400 milliGRAM(s) Oral daily  simvastatin 40 milliGRAM(s) Oral at bedtime  sodium chloride 0.45% with potassium chloride 20 mEq/L 1000 milliLiter(s) (75 mL/Hr) IV Continuous <Continuous>    MEDICATIONS  (PRN):  acetaminophen     Tablet .. 650 milliGRAM(s) Oral every 6 hours PRN Temp greater or equal to 38C (100.4F), Mild Pain (1 - 3)  aluminum hydroxide/magnesium hydroxide/simethicone Suspension 30 milliLiter(s) Oral every 4 hours PRN Dyspepsia  lidocaine 2% Viscous 5 milliLiter(s) Swish and Spit daily PRN Mouth Care  meclizine 12.5 milliGRAM(s) Oral three times a day PRN Dizziness  ondansetron Injectable 4 milliGRAM(s) IV Push every 8 hours PRN Nausea and/or Vomiting  zolpidem 5 milliGRAM(s) Oral at bedtime PRN Insomnia  zolpidem 5 milliGRAM(s) Oral at bedtime PRN Insomnia      Allergies    iodine (Unknown)    Intolerances        REVIEW OF SYSTEMS:  CONSTITUTIONAL: No fever, weight loss, or fatigue  EYES: No eye pain, visual disturbances, or discharge  ENMT:  No difficulty hearing, tinnitus, vertigo; No sinus or throat pain  NECK: No pain or stiffness  BREASTS: No pain, masses, or nipple discharge  RESPIRATORY: No cough, wheezing, chills or hemoptysis; No shortness of breath  CARDIOVASCULAR: No chest pain, palpitations, dizziness, or leg swelling  GASTROINTESTINAL: No abdominal or epigastric pain. No nausea, vomiting, or hematemesis; No diarrhea or constipation. No melena or hematochezia.  GENITOURINARY: No dysuria, frequency, hematuria, or incontinence  NEUROLOGICAL: No headaches, memory loss, loss of strength, numbness, or tremors  SKIN: No itching, burning, rashes, or lesions   LYMPH NODES: No enlarged glands  ENDOCRINE: No heat or cold intolerance; No hair loss  MUSCULOSKELETAL: No joint pain or swelling; No muscle, back, or extremity pain  PSYCHIATRIC: No depression, anxiety, mood swings, or difficulty sleeping  HEME/LYMPH: No easy bruising, or bleeding gums  ALLERGY AND IMMUNOLOGIC: No hives or eczema    Vital Signs Last 24 Hrs  T(C): 36.6 (04 Aug 2024 20:08), Max: 36.6 (04 Aug 2024 04:39)  T(F): 97.8 (04 Aug 2024 20:08), Max: 97.9 (04 Aug 2024 04:39)  HR: 65 (04 Aug 2024 20:08) (66 - 73)  BP: 135/69 (04 Aug 2024 20:08) (135/69 - 152/69)  BP(mean): --  RR: 18 (04 Aug 2024 20:08) (18 - 18)  SpO2: 95% (04 Aug 2024 20:08) (95% - 97%)    Parameters below as of 04 Aug 2024 20:08  Patient On (Oxygen Delivery Method): room air        PHYSICAL EXAM:  GENERAL: NAD, well-groomed, well-developed  HEAD:  Atraumatic, Normocephalic  EYES: EOMI, PERRLA, conjunctiva and sclera clear  ENMT: No tonsillar erythema, exudates, or enlargement; Moist mucous membranes, Good dentition, No lesions  NECK: Supple, No JVD, Normal thyroid  NERVOUS SYSTEM:  Alert & Oriented X3, Motor Strength 5/5 B/L upper and lower extremities; DTRs 2+ intact and symmetric  CHEST/LUNG: Clear to percussion bilaterally; No rales, rhonchi, wheezing, or rubs  HEART: Regular rate and rhythm; No murmurs, rubs, or gallops  ABDOMEN: Soft, Nontender, Nondistended; Bowel sounds present  EXTREMITIES:  2+ Peripheral Pulses, No clubbing, cyanosis, or edema  LYMPH: No lymphadenopathy noted  SKIN: No rashes or lesions    LABS:    08-04    144  |  110<H>  |  7   ----------------------------<  173<H>  3.7   |  27  |  1.00    Ca    8.5      04 Aug 2024 08:08  Mg     1.8     08-04    TPro  5.9<L>  /  Alb  3.1<L>  /  TBili  0.4  /  DBili  x   /  AST  32  /  ALT  35  /  AlkPhos  44  08-04      Urinalysis Basic - ( 04 Aug 2024 08:08 )    Color: x / Appearance: x / SG: x / pH: x  Gluc: 173 mg/dL / Ketone: x  / Bili: x / Urobili: x   Blood: x / Protein: x / Nitrite: x   Leuk Esterase: x / RBC: x / WBC x   Sq Epi: x / Non Sq Epi: x / Bacteria: x      CAPILLARY BLOOD GLUCOSE      POCT Blood Glucose.: 168 mg/dL (04 Aug 2024 21:19)  POCT Blood Glucose.: 109 mg/dL (04 Aug 2024 17:05)  POCT Blood Glucose.: 311 mg/dL (04 Aug 2024 11:43)  POCT Blood Glucose.: 144 mg/dL (04 Aug 2024 07:30)      RADIOLOGY & ADDITIONAL TESTS:    Imaging Personally Reviewed:  [ ] YES  [ ] NO    Consultant(s) Notes Reviewed:  [ ] YES  [ ] NO    Care Discussed with Consultants/Other Providers [ ] YES  [ ] NO  
no

## 2024-08-06 ENCOUNTER — TRANSCRIPTION ENCOUNTER (OUTPATIENT)
Age: 79
End: 2024-08-06

## 2024-08-06 VITALS
RESPIRATION RATE: 18 BRPM | HEART RATE: 67 BPM | OXYGEN SATURATION: 96 % | DIASTOLIC BLOOD PRESSURE: 69 MMHG | SYSTOLIC BLOOD PRESSURE: 151 MMHG | TEMPERATURE: 98 F

## 2024-08-06 LAB — GLUCOSE BLDC GLUCOMTR-MCNC: 166 MG/DL — HIGH (ref 70–99)

## 2024-08-06 PROCEDURE — 76705 ECHO EXAM OF ABDOMEN: CPT

## 2024-08-06 PROCEDURE — 80048 BASIC METABOLIC PNL TOTAL CA: CPT

## 2024-08-06 PROCEDURE — 97161 PT EVAL LOW COMPLEX 20 MIN: CPT

## 2024-08-06 PROCEDURE — 80053 COMPREHEN METABOLIC PANEL: CPT

## 2024-08-06 PROCEDURE — 36415 COLL VENOUS BLD VENIPUNCTURE: CPT

## 2024-08-06 PROCEDURE — 83735 ASSAY OF MAGNESIUM: CPT

## 2024-08-06 PROCEDURE — 74181 MRI ABDOMEN W/O CONTRAST: CPT | Mod: MC

## 2024-08-06 PROCEDURE — 82962 GLUCOSE BLOOD TEST: CPT

## 2024-08-06 PROCEDURE — 85025 COMPLETE CBC W/AUTO DIFF WBC: CPT

## 2024-08-06 PROCEDURE — 87040 BLOOD CULTURE FOR BACTERIA: CPT

## 2024-08-06 PROCEDURE — 83036 HEMOGLOBIN GLYCOSYLATED A1C: CPT

## 2024-08-06 RX ORDER — AMLODIPINE BESYLATE 2.5 MG/1
5 TABLET ORAL DAILY
Refills: 0 | Status: DISCONTINUED | OUTPATIENT
Start: 2024-08-06 | End: 2024-08-06

## 2024-08-06 RX ORDER — LOSARTAN POTASSIUM 50 MG/1
1 TABLET, FILM COATED ORAL
Refills: 0 | DISCHARGE

## 2024-08-06 RX ORDER — ACETAMINOPHEN 500 MG
2 TABLET ORAL
Qty: 0 | Refills: 0 | DISCHARGE
Start: 2024-08-06

## 2024-08-06 RX ORDER — LOSARTAN POTASSIUM 50 MG/1
1 TABLET, FILM COATED ORAL
Qty: 30 | Refills: 0
Start: 2024-08-06 | End: 2024-09-04

## 2024-08-06 RX ORDER — MAGNESIUM OXIDE 253 MG/1
1 TABLET ORAL
Qty: 0 | Refills: 0 | DISCHARGE
Start: 2024-08-06

## 2024-08-06 RX ORDER — MECLIZINE 12.5 MG/1
1 TABLET ORAL
Qty: 0 | Refills: 0 | DISCHARGE
Start: 2024-08-06

## 2024-08-06 RX ADMIN — Medication 2: at 08:32

## 2024-08-06 RX ADMIN — HEPARIN SODIUM 5000 UNIT(S): 1000 INJECTION, SOLUTION INTRAVENOUS; SUBCUTANEOUS at 05:28

## 2024-08-06 RX ADMIN — LOSARTAN POTASSIUM 100 MILLIGRAM(S): 50 TABLET, FILM COATED ORAL at 05:28

## 2024-08-06 RX ADMIN — Medication 5 MILLIGRAM(S): at 02:08

## 2024-08-06 NOTE — DISCHARGE NOTE PROVIDER - CARE PROVIDER_API CALL
Alvin Orourke  Gastroenterology  [address] [place] [address]  Phone: [phone]  Fax: [phone]  Follow Up Time:     [place] [address]  Phone: [phone]  Fax: [phone]  Follow Up Time:    Alvin Orourke  Gastroenterology  [address] [place] [address]  Phone: [phone]  Fax: [phone]  Follow Up Time:     Tashia Kraft  Family Medicine  [address] [place] [address]  Phone: [phone]  Fax: [phone]  Follow Up Time:     Danika Kaplan  Endocrinology/Metab/Diabetes  [address] [facility], [address]  [place] [address]  Phone: [phone]  Fax: [phone]  Follow Up Time:     Farzad Schwartz  Urology  [address], [address]  [place] [address]  Phone: [phone]  Fax: [phone]  Follow Up Time:

## 2024-08-06 NOTE — DISCHARGE NOTE PROVIDER - PROVIDER TOKENS
PROVIDER:[TOKEN:[[id number]]],PROVIDER:[TOKEN:[[id number]]] PROVIDER:[TOKEN:[[id number]]],PROVIDER:[TOKEN:[[id number]]],PROVIDER:[TOKEN:[[id number]]],PROVIDER:[TOKEN:[[id number]]]

## 2024-08-06 NOTE — GOALS OF CARE CONVERSATION - ADVANCED CARE PLANNING - CONVERSATION DETAILS
Palliative care SW met with patient and her  at bedside to discuss advanced care planning. Reviewed patient's medical and social history as well as events leading to patient's hospitalization. Writer discussed patient's current diagnosis (Dehydration, uncontrolled DM, HTN, hypercholesterolemia, urinary bladder lesion), medical condition and management. Inquired about advanced directives. Patient reports she completed a HCP and Living Will with her daughter, Maral, as health care agent. Inquired about patient's wishes regarding extent of medical care to be provided including thoughts regarding cardiopulmonary resuscitation and mechanical ventilation/intubation. Patient showed insight into medical condition. At this time patient states she would want everything done including CPR. Writer encouraged patient to discuss her wishes with her health care agent. All questions answered. Psychosocial support provided.
Do you have Advance Directives (HCP / LV / Organ donation / Documentation of oral advance Directive):   ( x   )  yes    (      )    NO                                                                            Do you have LV - Living will :                                                                                                                              ( x   )  yes    (      )   No    Do you have HCP - Health Care Proxy:                                                                                                             (  x   )  yes   (       ) N0    Do you have DNR- Do Not Resuscitate :                                                                                                        (      )  yes  (    x    )  No    Do you have DNI- Do Not intubate  :                                                                                                                 (      )  yes   (   x    ) No    Do you have MOLST - Medical orders for Life sustaining treatment  :                                                          (      ) yes    (  x     ) No    Decision Maker :  (  x   ) Patient     (      )  HCA   (     ) Public Health Law Surrogate     (      ) Surrogate  (       ) Guardian    Goals of Care :  (   x   )   Complete Care     (       ) No Limitations                              (       )   Comfort Care       (       )  Hospice                               (      )   Limited medical Intervention / s    Medical Interventions :   (   x     )   CPR       (        )  DNR                                               (     x   )  Intubation with MV - Mechanical Ventilation  (   x   ) BIPAP/CPAP    (         )   DNI                                               (     x    )  Artificial Nutrition -  IVF, TPN / PPN, Tube Feeds             (         )   No Feeding Tube                                                (    x    ) Use Antibiotics                         (          ) No Antibiotics                                                (    x     ) Blood and Blood Products     (         )   No Blood or Blood products                                                (     x     )  Dialysis                                    (         )  No Dialysis                                                (          )  Medical Management only  (         )  No Invasive Interventions or Surgery  Time spent :                        (  x     ) upto 30 minutes                       (           )   more than 30 minutes  ACP reviewed

## 2024-08-06 NOTE — DISCHARGE NOTE PROVIDER - HOSPITAL COURSE
admitted for intractable nausea  resolved with zofran and reglan  etiology unclear - possibly related to meds  underwent MRI - CBD stone ruled out  out patient GI follow up for possible EGD and gastric emptying study  DC after GI clearance  outpatient urology eval for trigone lesion

## 2024-08-06 NOTE — DISCHARGE NOTE PROVIDER - NSDCCPCAREPLAN_GEN_ALL_CORE_FT
PRINCIPAL DISCHARGE DIAGNOSIS  Diagnosis: N&V (nausea and vomiting)  Assessment and Plan of Treatment: follow up with GI MD Dr. HUGO

## 2024-08-06 NOTE — DISCHARGE NOTE PROVIDER - NSDCMRMEDTOKEN_GEN_ALL_CORE_FT
acetaminophen 325 mg oral tablet: 2 tab(s) orally every 6 hours As needed Temp greater or equal to 38C (100.4F), Mild Pain (1 - 3)  glimepiride 4 mg oral tablet: 1 tab(s) orally once a day  losartan 100 mg oral tablet: 1 tab(s) orally once a day  magnesium oxide 400 mg oral tablet: 1 tab(s) orally once a day  meclizine 12.5 mg oral tablet: 1 tab(s) orally 3 times a day As needed Dizziness  metformin 500 mg oral tablet: 1 tab(s) orally 2 times a day  pioglitazone 30 mg oral tablet: 1 tab(s) orally once a day  simvastatin 40 mg oral tablet: 1 tab(s) orally once a day (at bedtime)  zolpidem 10 mg oral tablet: 1 tab(s) orally once a day (at bedtime)

## 2024-08-06 NOTE — DISCHARGE NOTE PROVIDER - CARE PROVIDERS DIRECT ADDRESSES
,DirectAddress_Unknown,[email or web address] ,DirectAddress_Unknown,[email or web address],DirectAddress_Unknown,[email or web address]

## 2024-08-06 NOTE — CASE MANAGEMENT PROGRESS NOTE - NSCMPROGRESSNOTE_GEN_ALL_CORE
MRCP done anf negative - Ready for DC as per MD- No needs identified for transition home . Family to transport

## 2024-08-06 NOTE — DISCHARGE NOTE NURSING/CASE MANAGEMENT/SOCIAL WORK - PATIENT PORTAL LINK FT
You can access the FollowMyHealth Patient Portal offered by [facility] by registering at the following website: [email or web address]. By joining [employer]’s FollowMyHealth portal, you will also be able to view your health information using other applications (apps) compatible with our system.

## 2024-08-09 PROBLEM — I25.10 ATHEROSCLEROTIC HEART DISEASE OF NATIVE CORONARY ARTERY WITHOUT ANGINA PECTORIS: Chronic | Status: ACTIVE | Noted: 2024-08-03

## 2024-08-09 PROBLEM — E11.9 TYPE 2 DIABETES MELLITUS WITHOUT COMPLICATIONS: Chronic | Status: ACTIVE | Noted: 2024-08-03

## 2024-08-09 PROBLEM — I10 ESSENTIAL (PRIMARY) HYPERTENSION: Chronic | Status: ACTIVE | Noted: 2024-08-03

## 2024-08-09 PROBLEM — E78.00 PURE HYPERCHOLESTEROLEMIA, UNSPECIFIED: Chronic | Status: ACTIVE | Noted: 2024-08-03

## 2024-08-11 LAB
CULTURE RESULTS: SIGNIFICANT CHANGE UP
SPECIMEN SOURCE: SIGNIFICANT CHANGE UP

## 2024-08-14 ENCOUNTER — NON-APPOINTMENT (OUTPATIENT)
Age: 79
End: 2024-08-14

## 2024-08-14 PROBLEM — Z00.00 ENCOUNTER FOR PREVENTIVE HEALTH EXAMINATION: Status: ACTIVE | Noted: 2024-08-14

## 2024-08-15 ENCOUNTER — NON-APPOINTMENT (OUTPATIENT)
Age: 79
End: 2024-08-15

## 2024-08-15 ENCOUNTER — APPOINTMENT (OUTPATIENT)
Dept: OTOLARYNGOLOGY | Facility: CLINIC | Age: 79
End: 2024-08-15
Payer: MEDICARE

## 2024-08-15 VITALS — BODY MASS INDEX: 29.23 KG/M2 | WEIGHT: 165 LBS | HEIGHT: 63 IN

## 2024-08-15 VITALS — DIASTOLIC BLOOD PRESSURE: 74 MMHG | HEART RATE: 96 BPM | SYSTOLIC BLOOD PRESSURE: 116 MMHG

## 2024-08-15 DIAGNOSIS — Z86.39 PERSONAL HISTORY OF OTHER ENDOCRINE, NUTRITIONAL AND METABOLIC DISEASE: ICD-10-CM

## 2024-08-15 DIAGNOSIS — J34.2 DEVIATED NASAL SEPTUM: ICD-10-CM

## 2024-08-15 DIAGNOSIS — J01.90 ACUTE SINUSITIS, UNSPECIFIED: ICD-10-CM

## 2024-08-15 DIAGNOSIS — Z83.3 FAMILY HISTORY OF DIABETES MELLITUS: ICD-10-CM

## 2024-08-15 PROCEDURE — 31231 NASAL ENDOSCOPY DX: CPT

## 2024-08-15 PROCEDURE — 99204 OFFICE O/P NEW MOD 45 MIN: CPT | Mod: 25

## 2024-08-15 RX ORDER — LOSARTAN POTASSIUM 100 MG/1
TABLET, FILM COATED ORAL
Refills: 0 | Status: ACTIVE | COMMUNITY

## 2024-08-15 RX ORDER — METFORMIN HYDROCHLORIDE 625 MG/1
TABLET ORAL
Refills: 0 | Status: ACTIVE | COMMUNITY

## 2024-08-15 RX ORDER — SITAGLIPTIN 100 MG/1
TABLET, FILM COATED ORAL
Refills: 0 | Status: ACTIVE | COMMUNITY

## 2024-08-15 RX ORDER — FLUTICASONE PROPIONATE 50 UG/1
50 SPRAY, METERED NASAL TWICE DAILY
Qty: 1 | Refills: 1 | Status: ACTIVE | COMMUNITY
Start: 2024-08-15 | End: 1900-01-01

## 2024-08-15 RX ORDER — ZOLPIDEM TARTRATE 7.5 MG/1
CAPSULE ORAL
Refills: 0 | Status: ACTIVE | COMMUNITY

## 2024-08-15 RX ORDER — DOXYCYCLINE HYCLATE 100 MG/1
100 CAPSULE ORAL
Qty: 14 | Refills: 0 | Status: ACTIVE | COMMUNITY
Start: 2024-08-15 | End: 1900-01-01

## 2024-08-15 NOTE — PHYSICAL EXAM
[Nasal Endoscopy Performed] : nasal endoscopy was performed, see procedure section for findings [] : septum deviated to the left [Midline] : trachea located in midline position [de-identified] : Extraction of site of left upper lateral incisor, healed. [Normal] : no rashes

## 2024-08-15 NOTE — HISTORY OF PRESENT ILLNESS
[de-identified] : Celeste Thorne is a 77 yo female with hx DM who presents for evaluation of sinus issues. A month ago, she had dental work, left upper dental extraction, and bone graft placement. Since then, she has been on a two week course of antibiotics. She notes left sided cheek swelling and sinus pressure. She denies nasal congestion, rhinorrhea, or postnasal drainage. She denies history of recurrent sinusitis. She denies history of allergies. she denies vision changes or pain/restriction of extraocular movements. She was seen by her oral surgeon and was told her exam was fine.

## 2024-08-15 NOTE — ASSESSMENT
[FreeTextEntry1] : Celeste Thorne presents for evaluation of acute sinusitis symptoms that occurred after left upper dental extraction and bone graft placement. Sinonasal endoscopy was performed showing septal deviation to left. Will try new antibiotic and topical nasal regimen. If pain persists, will perform new CT sinus at next visit.  - start doxycycline x 7 days. Side effects of doxycycline use were discussed and include, but are not limited to diarrhea, rash, skin photosensitivity, skin hyperpigmentation, esophagitis, gastrointestinal issues, tooth discoloration, and hepatotoxicity. - start nasal saline sprays BID x 3 weeks. - start fluticasone 2 sprays BID x 3 weeks. - f/u in 3 weeks.

## 2024-08-21 RX ORDER — AMOXICILLIN AND CLAVULANATE POTASSIUM 875; 125 MG/1; MG/1
875-125 TABLET, COATED ORAL
Qty: 20 | Refills: 0 | Status: ACTIVE | COMMUNITY
Start: 2024-08-21 | End: 1900-01-01

## 2024-08-28 ENCOUNTER — APPOINTMENT (OUTPATIENT)
Dept: OTOLARYNGOLOGY | Facility: CLINIC | Age: 79
End: 2024-08-28
Payer: MEDICARE

## 2024-08-28 ENCOUNTER — RESULT REVIEW (OUTPATIENT)
Age: 79
End: 2024-08-28

## 2024-08-28 VITALS
DIASTOLIC BLOOD PRESSURE: 65 MMHG | HEIGHT: 63 IN | BODY MASS INDEX: 25.87 KG/M2 | HEART RATE: 91 BPM | WEIGHT: 146 LBS | SYSTOLIC BLOOD PRESSURE: 106 MMHG

## 2024-08-28 DIAGNOSIS — J32.9 CHRONIC SINUSITIS, UNSPECIFIED: ICD-10-CM

## 2024-08-28 DIAGNOSIS — J34.2 DEVIATED NASAL SEPTUM: ICD-10-CM

## 2024-08-28 PROCEDURE — 99213 OFFICE O/P EST LOW 20 MIN: CPT

## 2024-08-28 PROCEDURE — 70486 CT MAXILLOFACIAL W/O DYE: CPT

## 2024-08-28 NOTE — ASSESSMENT
[FreeTextEntry1] : Celeste Thorne presents for follow-up of acute sinusitis symptoms that occurred after left upper dental extraction and bone graft placement. Sinonasal endoscopy was performed at last visit showing septal deviation to left. She has completed maximal medical therapy but has persistent symptoms. she has increased fatigue and signs of dehydration. CT sinus was performed in office and reviewed showing no evidence of sinus disease. Discussed that she should see her oral surgeon again given her pain since the procedure. in addition, advised that she proceed to [place] ED given her fatigue and weight loss. She will proceed there now.  - f/u prn

## 2024-08-28 NOTE — DATA REVIEWED
[de-identified] : CT sinus performed in office and reviewed 8/28/24: No evidence of sinus disease. 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 No evidence of oroantral fistula.

## 2024-08-28 NOTE — HISTORY OF PRESENT ILLNESS
[de-identified] : Celeste Thorne is a 77 yo female with hx DM who presents for evaluation of sinus issues. A month ago, she had dental work, left upper dental extraction, and bone graft placement. Since then, she has been on a two week course of antibiotics. She notes left sided cheek swelling and sinus pressure. She denies nasal congestion, rhinorrhea, or postnasal drainage. She denies history of recurrent sinusitis. She denies history of allergies. she denies vision changes or pain/restriction of extraocular movements. She was seen by her oral surgeon and was told her exam was fine. [FreeTextEntry1] : 8/28/24 - Ms. Thorne presents for follow-up. She antibiotic course, saline sprays, and flonase. She notes continued left sided cheek pain, also on the right. She denies nasal congestion, rhinorrhea, or postnasal drainage. She feels weak and off balance. She notes decreased appetite and has had some weight loss. No fevers.

## 2024-08-28 NOTE — PHYSICAL EXAM
[] : septum deviated to the left [Midline] : trachea located in midline position [Normal] : no rashes [de-identified] : Extraction of site of left upper lateral incisor, healed.

## 2024-09-05 ENCOUNTER — APPOINTMENT (OUTPATIENT)
Dept: OTOLARYNGOLOGY | Facility: CLINIC | Age: 79
End: 2024-09-05

## 2025-01-27 ENCOUNTER — APPOINTMENT (OUTPATIENT)
Dept: NUCLEAR MEDICINE | Facility: CLINIC | Age: 80
End: 2025-01-27